# Patient Record
Sex: FEMALE | Race: WHITE | Employment: FULL TIME | URBAN - NONMETROPOLITAN AREA
[De-identification: names, ages, dates, MRNs, and addresses within clinical notes are randomized per-mention and may not be internally consistent; named-entity substitution may affect disease eponyms.]

---

## 2019-08-30 ENCOUNTER — OFFICE VISIT (OUTPATIENT)
Dept: FAMILY MEDICINE CLINIC | Facility: CLINIC | Age: 44
End: 2019-08-30
Payer: COMMERCIAL

## 2019-08-30 ENCOUNTER — LAB ENCOUNTER (OUTPATIENT)
Dept: LAB | Age: 44
End: 2019-08-30
Attending: NURSE PRACTITIONER
Payer: COMMERCIAL

## 2019-08-30 VITALS
OXYGEN SATURATION: 99 % | SYSTOLIC BLOOD PRESSURE: 148 MMHG | HEIGHT: 63 IN | TEMPERATURE: 98 F | HEART RATE: 72 BPM | BODY MASS INDEX: 39.87 KG/M2 | DIASTOLIC BLOOD PRESSURE: 88 MMHG | WEIGHT: 225 LBS

## 2019-08-30 DIAGNOSIS — R53.82 CHRONIC FATIGUE: ICD-10-CM

## 2019-08-30 DIAGNOSIS — M79.7 FIBROMYALGIA: ICD-10-CM

## 2019-08-30 DIAGNOSIS — F41.9 ANXIETY: ICD-10-CM

## 2019-08-30 DIAGNOSIS — Z86.2 HISTORY OF ANEMIA: ICD-10-CM

## 2019-08-30 DIAGNOSIS — R03.0 ELEVATED BLOOD PRESSURE READING: Primary | ICD-10-CM

## 2019-08-30 DIAGNOSIS — R07.89 CHEST TIGHTNESS: ICD-10-CM

## 2019-08-30 DIAGNOSIS — R03.0 ELEVATED BLOOD PRESSURE READING: ICD-10-CM

## 2019-08-30 LAB
ALBUMIN SERPL-MCNC: 4 G/DL (ref 3.4–5)
ALBUMIN/GLOB SERPL: 1.2 {RATIO} (ref 1–2)
ALP LIVER SERPL-CCNC: 49 U/L (ref 37–98)
ALT SERPL-CCNC: 29 U/L (ref 13–56)
ANION GAP SERPL CALC-SCNC: 10 MMOL/L (ref 0–18)
AST SERPL-CCNC: 17 U/L (ref 15–37)
BASOPHILS # BLD AUTO: 0.04 X10(3) UL (ref 0–0.2)
BASOPHILS NFR BLD AUTO: 0.5 %
BILIRUB SERPL-MCNC: 0.6 MG/DL (ref 0.1–2)
BUN BLD-MCNC: 10 MG/DL (ref 7–18)
BUN/CREAT SERPL: 14.7 (ref 10–20)
CALCIUM BLD-MCNC: 8.9 MG/DL (ref 8.5–10.1)
CHLORIDE SERPL-SCNC: 105 MMOL/L (ref 98–112)
CHOLEST SMN-MCNC: 249 MG/DL (ref ?–200)
CO2 SERPL-SCNC: 27 MMOL/L (ref 21–32)
CREAT BLD-MCNC: 0.68 MG/DL (ref 0.55–1.02)
DEPRECATED RDW RBC AUTO: 47.8 FL (ref 35.1–46.3)
EOSINOPHIL # BLD AUTO: 0.15 X10(3) UL (ref 0–0.7)
EOSINOPHIL NFR BLD AUTO: 1.8 %
ERYTHROCYTE [DISTWIDTH] IN BLOOD BY AUTOMATED COUNT: 13.7 % (ref 11–15)
GLOBULIN PLAS-MCNC: 3.3 G/DL (ref 2.8–4.4)
GLUCOSE BLD-MCNC: 156 MG/DL (ref 70–99)
HCT VFR BLD AUTO: 45.9 % (ref 35–48)
HDLC SERPL-MCNC: 39 MG/DL (ref 40–59)
HGB BLD-MCNC: 15 G/DL (ref 12–16)
IMM GRANULOCYTES # BLD AUTO: 0.02 X10(3) UL (ref 0–1)
IMM GRANULOCYTES NFR BLD: 0.2 %
LDLC SERPL CALC-MCNC: 164 MG/DL (ref ?–100)
LYMPHOCYTES # BLD AUTO: 2.19 X10(3) UL (ref 1–4)
LYMPHOCYTES NFR BLD AUTO: 26.9 %
M PROTEIN MFR SERPL ELPH: 7.3 G/DL (ref 6.4–8.2)
MCH RBC QN AUTO: 30.7 PG (ref 26–34)
MCHC RBC AUTO-ENTMCNC: 32.7 G/DL (ref 31–37)
MCV RBC AUTO: 93.9 FL (ref 80–100)
MONOCYTES # BLD AUTO: 0.7 X10(3) UL (ref 0.1–1)
MONOCYTES NFR BLD AUTO: 8.6 %
NEUTROPHILS # BLD AUTO: 5.05 X10 (3) UL (ref 1.5–7.7)
NEUTROPHILS # BLD AUTO: 5.05 X10(3) UL (ref 1.5–7.7)
NEUTROPHILS NFR BLD AUTO: 62 %
NONHDLC SERPL-MCNC: 210 MG/DL (ref ?–130)
OSMOLALITY SERPL CALC.SUM OF ELEC: 296 MOSM/KG (ref 275–295)
PLATELET # BLD AUTO: 335 10(3)UL (ref 150–450)
POTASSIUM SERPL-SCNC: 3.9 MMOL/L (ref 3.5–5.1)
RBC # BLD AUTO: 4.89 X10(6)UL (ref 3.8–5.3)
SODIUM SERPL-SCNC: 142 MMOL/L (ref 136–145)
TRIGL SERPL-MCNC: 231 MG/DL (ref 30–149)
TSI SER-ACNC: 1.2 MIU/ML (ref 0.36–3.74)
VIT B12 SERPL-MCNC: 342 PG/ML (ref 193–986)
VIT D+METAB SERPL-MCNC: 25 NG/ML (ref 30–100)
VLDLC SERPL CALC-MCNC: 46 MG/DL (ref 0–30)
WBC # BLD AUTO: 8.2 X10(3) UL (ref 4–11)

## 2019-08-30 PROCEDURE — 83036 HEMOGLOBIN GLYCOSYLATED A1C: CPT | Performed by: NURSE PRACTITIONER

## 2019-08-30 PROCEDURE — 93000 ELECTROCARDIOGRAM COMPLETE: CPT | Performed by: NURSE PRACTITIONER

## 2019-08-30 PROCEDURE — 99204 OFFICE O/P NEW MOD 45 MIN: CPT | Performed by: NURSE PRACTITIONER

## 2019-08-30 PROCEDURE — 82607 VITAMIN B-12: CPT | Performed by: NURSE PRACTITIONER

## 2019-08-30 PROCEDURE — 80050 GENERAL HEALTH PANEL: CPT | Performed by: NURSE PRACTITIONER

## 2019-08-30 PROCEDURE — 82306 VITAMIN D 25 HYDROXY: CPT | Performed by: NURSE PRACTITIONER

## 2019-08-30 PROCEDURE — 80061 LIPID PANEL: CPT | Performed by: NURSE PRACTITIONER

## 2019-08-30 PROCEDURE — 36415 COLL VENOUS BLD VENIPUNCTURE: CPT | Performed by: NURSE PRACTITIONER

## 2019-08-30 RX ORDER — ALPRAZOLAM 0.5 MG/1
TABLET ORAL
Refills: 2 | COMMUNITY
Start: 2019-08-17 | End: 2019-11-27

## 2019-08-30 RX ORDER — HYDROCHLOROTHIAZIDE 12.5 MG/1
12.5 CAPSULE, GELATIN COATED ORAL DAILY
Qty: 30 CAPSULE | Refills: 0 | Status: SHIPPED | OUTPATIENT
Start: 2019-08-30 | End: 2019-09-22

## 2019-08-30 RX ORDER — ESCITALOPRAM OXALATE 10 MG/1
10 TABLET ORAL DAILY
Qty: 30 TABLET | Refills: 0 | Status: SHIPPED | OUTPATIENT
Start: 2019-08-30 | End: 2019-09-22

## 2019-08-30 NOTE — PROGRESS NOTES
HPI:   Patient is here for elevated BP. She had an appt with her gyne this morning for her annual exam. Her blood pressure was 170/112. She has been told her blood pressure has been elevated in the past but has never been on medication.  She has been unde Diagnoses and all orders for this visit:    Elevated blood pressure reading  -     ELECTROCARDIOGRAM, COMPLETE  -     CBC WITH DIFFERENTIAL WITH PLATELET; Future  -     COMP METABOLIC PANEL (14);  Future  -     TSH W REFLEX TO FREE T4; Future  -     LIPID

## 2019-08-31 DIAGNOSIS — E55.9 VITAMIN D DEFICIENCY: Primary | ICD-10-CM

## 2019-08-31 DIAGNOSIS — R73.09 ELEVATED RANDOM BLOOD GLUCOSE LEVEL: Primary | ICD-10-CM

## 2019-08-31 DIAGNOSIS — E78.00 ELEVATED CHOLESTEROL: ICD-10-CM

## 2019-08-31 DIAGNOSIS — E55.9 VITAMIN D DEFICIENCY: ICD-10-CM

## 2019-08-31 DIAGNOSIS — R03.0 ELEVATED BLOOD PRESSURE READING: Primary | ICD-10-CM

## 2019-08-31 LAB
EST. AVERAGE GLUCOSE BLD GHB EST-MCNC: 143 MG/DL (ref 68–126)
HBA1C MFR BLD HPLC: 6.6 % (ref ?–5.7)

## 2019-08-31 RX ORDER — ERGOCALCIFEROL (VITAMIN D2) 1250 MCG
50000 CAPSULE ORAL WEEKLY
Qty: 4 CAPSULE | Refills: 1 | Status: SHIPPED | OUTPATIENT
Start: 2019-08-31 | End: 2019-09-30

## 2019-09-06 ENCOUNTER — OFFICE VISIT (OUTPATIENT)
Dept: FAMILY MEDICINE CLINIC | Facility: CLINIC | Age: 44
End: 2019-09-06
Payer: COMMERCIAL

## 2019-09-06 VITALS
DIASTOLIC BLOOD PRESSURE: 80 MMHG | SYSTOLIC BLOOD PRESSURE: 138 MMHG | HEART RATE: 78 BPM | HEIGHT: 63 IN | TEMPERATURE: 98 F | OXYGEN SATURATION: 99 % | BODY MASS INDEX: 38.27 KG/M2 | RESPIRATION RATE: 18 BRPM | WEIGHT: 216 LBS

## 2019-09-06 DIAGNOSIS — I10 BENIGN HYPERTENSION: ICD-10-CM

## 2019-09-06 DIAGNOSIS — F41.9 ANXIETY: ICD-10-CM

## 2019-09-06 DIAGNOSIS — E11.9 NEW ONSET TYPE 2 DIABETES MELLITUS (HCC): Primary | ICD-10-CM

## 2019-09-06 DIAGNOSIS — K13.70 ORAL LESION: ICD-10-CM

## 2019-09-06 DIAGNOSIS — E55.9 VITAMIN D DEFICIENCY: ICD-10-CM

## 2019-09-06 PROCEDURE — 99214 OFFICE O/P EST MOD 30 MIN: CPT | Performed by: NURSE PRACTITIONER

## 2019-09-06 NOTE — PROGRESS NOTES
HPI:   Patient is here for 1 week follow up for her blood pressure, anxiety and to discuss lab results. At her last visit she was started on Hctz and Lexapro. We also started her on Vitamin D after her lab results revealed a vitamin d deficiency.  She rep /80   Pulse 78   Temp 97.9 °F (36.6 °C) (Temporal)   Resp 18   Ht 63\"   Wt 216 lb   SpO2 99%   BMI 38.26 kg/m²   Physical Exam    Constitutional: She is obese. She appears well-developed and well-nourished. No distress.    HENT:   Mouth/Throat:

## 2019-09-22 ENCOUNTER — TELEPHONE (OUTPATIENT)
Dept: FAMILY MEDICINE CLINIC | Facility: CLINIC | Age: 44
End: 2019-09-22

## 2019-09-22 DIAGNOSIS — R03.0 ELEVATED BLOOD PRESSURE READING: ICD-10-CM

## 2019-09-22 DIAGNOSIS — F41.9 ANXIETY: ICD-10-CM

## 2019-09-24 RX ORDER — HYDROCHLOROTHIAZIDE 12.5 MG/1
CAPSULE, GELATIN COATED ORAL
Qty: 90 CAPSULE | Refills: 0 | Status: SHIPPED | OUTPATIENT
Start: 2019-09-24 | End: 2019-12-06

## 2019-09-24 RX ORDER — ESCITALOPRAM OXALATE 10 MG/1
TABLET ORAL
Qty: 30 TABLET | Refills: 0 | Status: SHIPPED | OUTPATIENT
Start: 2019-09-24 | End: 2019-10-22

## 2019-10-21 DIAGNOSIS — F41.9 ANXIETY: ICD-10-CM

## 2019-10-22 RX ORDER — ESCITALOPRAM OXALATE 10 MG/1
TABLET ORAL
Qty: 30 TABLET | Refills: 0 | Status: SHIPPED | OUTPATIENT
Start: 2019-10-22 | End: 2019-11-23

## 2019-11-23 DIAGNOSIS — F41.9 ANXIETY: ICD-10-CM

## 2019-11-23 NOTE — TELEPHONE ENCOUNTER
Last refill #30 on 10/22/19  Last office visit on 9/6/19  Future Appointments   Date Time Provider Maria Guadalupe Martins   12/6/2019  9:00 AM [facility]

## 2019-11-24 RX ORDER — ESCITALOPRAM OXALATE 10 MG/1
TABLET ORAL
Qty: 30 TABLET | Refills: 0 | Status: SHIPPED | OUTPATIENT
Start: 2019-11-24 | End: 2019-12-06

## 2019-11-27 ENCOUNTER — TELEPHONE (OUTPATIENT)
Dept: FAMILY MEDICINE CLINIC | Facility: CLINIC | Age: 44
End: 2019-11-27

## 2019-11-27 DIAGNOSIS — F41.9 ANXIETY: ICD-10-CM

## 2019-11-27 RX ORDER — ALPRAZOLAM 0.5 MG/1
TABLET ORAL
Qty: 14 TABLET | Refills: 0 | Status: SHIPPED | OUTPATIENT
Start: 2019-11-27 | End: 2019-12-06

## 2019-11-27 NOTE — TELEPHONE ENCOUNTER
REFILL ALPRAZOLAM 0.5MG    HAS 1 LEFT  FOR TONIGHT  (PREVIOUSLY PRESCRIBED BY DR Janine CASTELLANO)     ALEJANDRO MOST LIKELY SENT REFILL REQUEST TO PREVIOUS PROVIDER.          PLEASE SEND TO [address]

## 2019-12-06 ENCOUNTER — OFFICE VISIT (OUTPATIENT)
Dept: FAMILY MEDICINE CLINIC | Facility: CLINIC | Age: 44
End: 2019-12-06
Payer: COMMERCIAL

## 2019-12-06 ENCOUNTER — APPOINTMENT (OUTPATIENT)
Dept: LAB | Age: 44
End: 2019-12-06
Attending: FAMILY MEDICINE
Payer: COMMERCIAL

## 2019-12-06 VITALS
TEMPERATURE: 98 F | OXYGEN SATURATION: 99 % | WEIGHT: 197 LBS | BODY MASS INDEX: 34.91 KG/M2 | HEIGHT: 63 IN | SYSTOLIC BLOOD PRESSURE: 128 MMHG | DIASTOLIC BLOOD PRESSURE: 78 MMHG | RESPIRATION RATE: 18 BRPM | HEART RATE: 78 BPM

## 2019-12-06 DIAGNOSIS — R23.2 HOT FLASHES: ICD-10-CM

## 2019-12-06 DIAGNOSIS — F51.05 INSOMNIA DUE TO OTHER MENTAL DISORDER: ICD-10-CM

## 2019-12-06 DIAGNOSIS — F41.9 ANXIETY: ICD-10-CM

## 2019-12-06 DIAGNOSIS — E11.9 NEW ONSET TYPE 2 DIABETES MELLITUS (HCC): ICD-10-CM

## 2019-12-06 DIAGNOSIS — F99 INSOMNIA DUE TO OTHER MENTAL DISORDER: ICD-10-CM

## 2019-12-06 DIAGNOSIS — R23.2 HOT FLASHES: Primary | ICD-10-CM

## 2019-12-06 DIAGNOSIS — I10 BENIGN HYPERTENSION: ICD-10-CM

## 2019-12-06 DIAGNOSIS — E55.9 VITAMIN D DEFICIENCY: ICD-10-CM

## 2019-12-06 DIAGNOSIS — R03.0 ELEVATED BLOOD PRESSURE READING: ICD-10-CM

## 2019-12-06 PROCEDURE — 80053 COMPREHEN METABOLIC PANEL: CPT | Performed by: NURSE PRACTITIONER

## 2019-12-06 PROCEDURE — 82570 ASSAY OF URINE CREATININE: CPT | Performed by: NURSE PRACTITIONER

## 2019-12-06 PROCEDURE — 99214 OFFICE O/P EST MOD 30 MIN: CPT | Performed by: NURSE PRACTITIONER

## 2019-12-06 PROCEDURE — 82670 ASSAY OF TOTAL ESTRADIOL: CPT | Performed by: NURSE PRACTITIONER

## 2019-12-06 PROCEDURE — 83002 ASSAY OF GONADOTROPIN (LH): CPT | Performed by: NURSE PRACTITIONER

## 2019-12-06 PROCEDURE — 36415 COLL VENOUS BLD VENIPUNCTURE: CPT | Performed by: NURSE PRACTITIONER

## 2019-12-06 PROCEDURE — 82043 UR ALBUMIN QUANTITATIVE: CPT | Performed by: NURSE PRACTITIONER

## 2019-12-06 PROCEDURE — 83036 HEMOGLOBIN GLYCOSYLATED A1C: CPT | Performed by: NURSE PRACTITIONER

## 2019-12-06 PROCEDURE — 83001 ASSAY OF GONADOTROPIN (FSH): CPT | Performed by: NURSE PRACTITIONER

## 2019-12-06 RX ORDER — ALPRAZOLAM 0.5 MG/1
0.5 TABLET ORAL NIGHTLY PRN
Qty: 30 TABLET | Refills: 0 | Status: SHIPPED | OUTPATIENT
Start: 2019-12-06 | End: 2020-01-17

## 2019-12-06 RX ORDER — HYDROCHLOROTHIAZIDE 12.5 MG/1
CAPSULE, GELATIN COATED ORAL
Qty: 90 CAPSULE | Refills: 0 | Status: SHIPPED | OUTPATIENT
Start: 2019-12-06 | End: 2020-03-19

## 2019-12-06 RX ORDER — ESCITALOPRAM OXALATE 10 MG/1
TABLET ORAL
Qty: 90 TABLET | Refills: 0 | Status: SHIPPED | OUTPATIENT
Start: 2019-12-06 | End: 2020-03-19

## 2019-12-06 NOTE — PROGRESS NOTES
HPI:   Patient is here for 3 month follow up for new onset diabetes, anxiety and high blood pressure. She has been doing the [employer] and has lost 20 lbs. She feels much better. Still having some difficulty with sleeping.  Unable to sleep without a Xanax Gastrointestinal: Negative for nausea, vomiting, abdominal pain, diarrhea and constipation. Endocrine: Negative for cold intolerance, heat intolerance, polydipsia, polyphagia and polyuria. Genitourinary: Positive for hot flashes.    Psychiatric/Behavi Take 1 tablet (0.5 mg total) by mouth nightly as needed for Sleep or Anxiety. Anxiety  -     ALPRAZolam 0.5 MG Oral Tab; Take 1 tablet (0.5 mg total) by mouth nightly as needed for Sleep or Anxiety.   -     escitalopram 10 MG Oral Tab; TAKE 1 TABLET(10 M

## 2019-12-10 ENCOUNTER — PATIENT MESSAGE (OUTPATIENT)
Dept: FAMILY MEDICINE CLINIC | Facility: CLINIC | Age: 44
End: 2019-12-10

## 2019-12-10 NOTE — TELEPHONE ENCOUNTER
From: America Sacks  To: REDD Hall  Sent: 12/10/2019 8:13 AM CST  Subject: Visit Follow-up Question     I'm not sure how to interpret the hormone levels from the blood test and if they're normal or low.  Did I need anything to balance them o

## 2019-12-11 NOTE — TELEPHONE ENCOUNTER
The hormone levels indicate that she is menopausal, which could explain the hot flashes. I would not recommend hormone replacement therapy due to the hypertension and diabetes. She could see a gynecologist for their opinion though.

## 2019-12-26 DIAGNOSIS — F51.05 INSOMNIA DUE TO OTHER MENTAL DISORDER: ICD-10-CM

## 2019-12-26 DIAGNOSIS — F41.9 ANXIETY: ICD-10-CM

## 2019-12-26 DIAGNOSIS — F99 INSOMNIA DUE TO OTHER MENTAL DISORDER: ICD-10-CM

## 2019-12-26 RX ORDER — ALPRAZOLAM 0.5 MG/1
TABLET ORAL
Qty: 30 TABLET | Refills: 0 | OUTPATIENT
Start: 2019-12-26

## 2019-12-26 NOTE — TELEPHONE ENCOUNTER
LOV: 12/6/19    LAST LAB: 12/6/19    LAST RX: 12/6/19, 30 tablets, 0 refills    Next OV: No future appointments.     PROTOCOL : n/a

## 2020-01-17 DIAGNOSIS — F51.05 INSOMNIA DUE TO OTHER MENTAL DISORDER: ICD-10-CM

## 2020-01-17 DIAGNOSIS — F99 INSOMNIA DUE TO OTHER MENTAL DISORDER: ICD-10-CM

## 2020-01-17 DIAGNOSIS — F41.9 ANXIETY: ICD-10-CM

## 2020-01-17 RX ORDER — ALPRAZOLAM 0.5 MG/1
TABLET ORAL
Qty: 14 TABLET | Refills: 0 | Status: SHIPPED | OUTPATIENT
Start: 2020-01-17 | End: 2020-02-05

## 2020-01-17 RX ORDER — ALPRAZOLAM 0.5 MG/1
0.5 TABLET ORAL NIGHTLY PRN
Qty: 30 TABLET | Refills: 0 | OUTPATIENT
Start: 2020-01-17

## 2020-02-05 DIAGNOSIS — F41.9 ANXIETY: ICD-10-CM

## 2020-02-05 DIAGNOSIS — F99 INSOMNIA DUE TO OTHER MENTAL DISORDER: ICD-10-CM

## 2020-02-05 DIAGNOSIS — F51.05 INSOMNIA DUE TO OTHER MENTAL DISORDER: ICD-10-CM

## 2020-02-05 RX ORDER — ALPRAZOLAM 0.5 MG/1
TABLET ORAL
Qty: 14 TABLET | Refills: 0 | Status: SHIPPED | OUTPATIENT
Start: 2020-02-05 | End: 2020-02-27

## 2020-02-05 NOTE — TELEPHONE ENCOUNTER
Last office visit: 12/6/19  Last refill: 1/17/2020  Labs Due: 2/29/2020  No future appointments.   Protocol: none   Name from pharmacy: ALPRAZOLAM 0.5MG TABLETS         Will file in chart as: ALPRAZOLAM 0.5 MG Oral Tab         Sig: TAKE 1 TABLET BY MOUTH EV

## 2020-02-27 DIAGNOSIS — F51.05 INSOMNIA DUE TO OTHER MENTAL DISORDER: ICD-10-CM

## 2020-02-27 DIAGNOSIS — F41.9 ANXIETY: ICD-10-CM

## 2020-02-27 DIAGNOSIS — F99 INSOMNIA DUE TO OTHER MENTAL DISORDER: ICD-10-CM

## 2020-02-27 RX ORDER — ALPRAZOLAM 0.5 MG/1
TABLET ORAL
Qty: 14 TABLET | Refills: 0 | Status: SHIPPED | OUTPATIENT
Start: 2020-02-27 | End: 2020-03-19

## 2020-02-27 NOTE — TELEPHONE ENCOUNTER
Last refill #14 on 2/5/2020  Last office visit on 12/6/19  Patient has only been seen by Joni Jeffery. No future appointments.

## 2020-02-28 ENCOUNTER — OFFICE VISIT (OUTPATIENT)
Dept: FAMILY MEDICINE CLINIC | Facility: CLINIC | Age: 45
End: 2020-02-28

## 2020-02-28 ENCOUNTER — TELEPHONE (OUTPATIENT)
Dept: FAMILY MEDICINE CLINIC | Facility: CLINIC | Age: 45
End: 2020-02-28

## 2020-02-28 VITALS
SYSTOLIC BLOOD PRESSURE: 126 MMHG | DIASTOLIC BLOOD PRESSURE: 74 MMHG | RESPIRATION RATE: 16 BRPM | OXYGEN SATURATION: 98 % | HEIGHT: 63 IN | HEART RATE: 76 BPM | WEIGHT: 199.13 LBS | TEMPERATURE: 97 F | BODY MASS INDEX: 35.28 KG/M2

## 2020-02-28 DIAGNOSIS — B75: Primary | ICD-10-CM

## 2020-02-28 DIAGNOSIS — J01.00 ACUTE NON-RECURRENT MAXILLARY SINUSITIS: ICD-10-CM

## 2020-02-28 DIAGNOSIS — K59.09 OTHER CONSTIPATION: ICD-10-CM

## 2020-02-28 PROCEDURE — 99214 OFFICE O/P EST MOD 30 MIN: CPT | Performed by: NURSE PRACTITIONER

## 2020-02-28 RX ORDER — ALBENDAZOLE 200 MG/1
400 TABLET, FILM COATED ORAL 2 TIMES DAILY
Qty: 40 TABLET | Refills: 0 | Status: SHIPPED | OUTPATIENT
Start: 2020-02-28 | End: 2020-03-09

## 2020-02-28 RX ORDER — AZITHROMYCIN 250 MG/1
TABLET, FILM COATED ORAL
Qty: 6 TABLET | Refills: 0 | Status: SHIPPED | OUTPATIENT
Start: 2020-02-28 | End: 2020-03-26 | Stop reason: ALTCHOICE

## 2020-02-28 NOTE — PROGRESS NOTES
HPI:   She believes she has stomach parasite. She was in [place] 3 weeks ago and went to [facility]. She started noticing some constipation while on vacation but it has worsened since she returned. She has seen some worms in her stool as well.      Juana Chambers swallowing. Respiratory: Negative for cough, shortness of breath and wheezing. Cardiovascular: Negative for chest pain and palpitations. Gastrointestinal: Positive for abdominal pain (intermittently cramps), constipation and abdominal distention.  Melinda Vale Start anthelmintic and obtain stool for O&P. Orders:  -     albendazole 200 MG Oral Tab; Take 2 tablets (400 mg total) by mouth 2 (two) times daily for 10 days.  -     OVA AND PARASITES;  Future    Acute non-recurrent maxillary sinusitis  -     mirlande

## 2020-03-02 ENCOUNTER — LAB ENCOUNTER (OUTPATIENT)
Dept: LAB | Age: 45
End: 2020-03-02
Attending: FAMILY MEDICINE
Payer: COMMERCIAL

## 2020-03-02 DIAGNOSIS — B75: ICD-10-CM

## 2020-03-02 PROCEDURE — 87329 GIARDIA AG IA: CPT

## 2020-03-02 PROCEDURE — 87272 CRYPTOSPORIDIUM AG IF: CPT

## 2020-03-02 PROCEDURE — 87177 OVA AND PARASITES SMEARS: CPT

## 2020-03-02 PROCEDURE — 87209 SMEAR COMPLEX STAIN: CPT

## 2020-03-03 LAB
CRYPTOSP AG STL QL IA: NEGATIVE
G LAMBLIA AG STL QL IA: NEGATIVE

## 2020-03-05 LAB — OVA AND PARASITE, FECAL INTERPRETATION: NEGATIVE

## 2020-03-16 DIAGNOSIS — F41.9 ANXIETY: ICD-10-CM

## 2020-03-16 DIAGNOSIS — F99 INSOMNIA DUE TO OTHER MENTAL DISORDER: ICD-10-CM

## 2020-03-16 DIAGNOSIS — F51.05 INSOMNIA DUE TO OTHER MENTAL DISORDER: ICD-10-CM

## 2020-03-17 RX ORDER — ALPRAZOLAM 0.5 MG/1
TABLET ORAL
Qty: 14 TABLET | Refills: 0 | OUTPATIENT
Start: 2020-03-17

## 2020-03-17 NOTE — TELEPHONE ENCOUNTER
LOV  2/28/2020     LAST LAB  12/06/2019    LAST RX  2/27/2020  (14 tabs, 0 refill)    Next OV  No future appointments.     PROTOCOL  none

## 2020-03-19 DIAGNOSIS — F41.9 ANXIETY: ICD-10-CM

## 2020-03-19 DIAGNOSIS — F51.05 INSOMNIA DUE TO OTHER MENTAL DISORDER: ICD-10-CM

## 2020-03-19 DIAGNOSIS — R03.0 ELEVATED BLOOD PRESSURE READING: ICD-10-CM

## 2020-03-19 DIAGNOSIS — F99 INSOMNIA DUE TO OTHER MENTAL DISORDER: ICD-10-CM

## 2020-03-19 RX ORDER — ALPRAZOLAM 0.5 MG/1
0.5 TABLET ORAL
Qty: 14 TABLET | Refills: 0 | Status: SHIPPED | OUTPATIENT
Start: 2020-03-19 | End: 2020-04-16

## 2020-03-19 RX ORDER — HYDROCHLOROTHIAZIDE 12.5 MG/1
CAPSULE, GELATIN COATED ORAL
Qty: 90 CAPSULE | Refills: 0 | Status: SHIPPED | OUTPATIENT
Start: 2020-03-19 | End: 2020-06-29

## 2020-03-19 RX ORDER — ESCITALOPRAM OXALATE 10 MG/1
TABLET ORAL
Qty: 90 TABLET | Refills: 0 | Status: SHIPPED | OUTPATIENT
Start: 2020-03-19 | End: 2020-06-29

## 2020-03-19 NOTE — TELEPHONE ENCOUNTER
Hypertension Medications Protocol Passed3/19 8:16 AM   CMP or BMP in past 12 months    Last serum creatinine< 2.0    Appointment in past 6 or next 3 months     Last OV w/ Nii Collier Np 2/28/20-acute  Overdue for annual physical  Last [facility] 12/6/19  Las

## 2020-03-26 ENCOUNTER — TELEPHONE (OUTPATIENT)
Dept: FAMILY MEDICINE CLINIC | Facility: CLINIC | Age: 45
End: 2020-03-26

## 2020-03-26 ENCOUNTER — OFFICE VISIT (OUTPATIENT)
Dept: FAMILY MEDICINE CLINIC | Facility: CLINIC | Age: 45
End: 2020-03-26

## 2020-03-26 VITALS
SYSTOLIC BLOOD PRESSURE: 114 MMHG | HEART RATE: 60 BPM | WEIGHT: 200 LBS | TEMPERATURE: 99 F | DIASTOLIC BLOOD PRESSURE: 74 MMHG | BODY MASS INDEX: 35 KG/M2

## 2020-03-26 DIAGNOSIS — M26.629 TMJ PAIN DYSFUNCTION SYNDROME: Primary | ICD-10-CM

## 2020-03-26 DIAGNOSIS — H92.01 OTALGIA OF RIGHT EAR: ICD-10-CM

## 2020-03-26 DIAGNOSIS — F41.9 ANXIETY: ICD-10-CM

## 2020-03-26 PROCEDURE — 99213 OFFICE O/P EST LOW 20 MIN: CPT | Performed by: FAMILY MEDICINE

## 2020-03-26 PROCEDURE — 98925 OSTEOPATH MANJ 1-2 REGIONS: CPT | Performed by: FAMILY MEDICINE

## 2020-03-26 RX ORDER — CYCLOBENZAPRINE HCL 10 MG
10 TABLET ORAL 3 TIMES DAILY
Qty: 30 TABLET | Refills: 1 | Status: SHIPPED | OUTPATIENT
Start: 2020-03-26 | End: 2020-04-15

## 2020-03-26 RX ORDER — CETIRIZINE HYDROCHLORIDE 10 MG/1
10 TABLET ORAL DAILY
COMMUNITY
End: 2020-09-17 | Stop reason: ALTCHOICE

## 2020-03-26 RX ORDER — MELOXICAM 15 MG/1
15 TABLET ORAL DAILY
Qty: 30 TABLET | Refills: 0 | Status: SHIPPED | OUTPATIENT
Start: 2020-03-26 | End: 2020-09-17 | Stop reason: ALTCHOICE

## 2020-03-26 RX ORDER — FLUTICASONE PROPIONATE 50 MCG
SPRAY, SUSPENSION (ML) NASAL DAILY
COMMUNITY
End: 2020-09-17 | Stop reason: ALTCHOICE

## 2020-03-26 NOTE — PROGRESS NOTES
HPI:    Patient ID: Jeffry Pagan is a 38year old female. Hx sinus infx / ear  R ear pain  + stress  HPI    Review of Systems   Constitutional: Negative for chills and fever. HENT: Positive for congestion (mild) and rhinorrhea.     Respiratory: Negat gum  Counseling         ASSESSMENT/PLAN:   Tmj pain dysfunction syndrome  (primary encounter diagnosis)  Otalgia of right ear  Anxiety    No orders of the defined types were placed in this encounter.       Meds This Visit:  Requested Prescriptions     Juany

## 2020-03-26 NOTE — TELEPHONE ENCOUNTER
My Chart message, was seen by Morgan Zarate on 2/28/20    Telemed call?         From: Lux Sheikh  To: REDD Bui  Sent: 3/25/2020  7:30 PM CDT  Subject: Visit Follow-up Question    Janak Francis        [facility] right ear is still hurting me.  I've tried b

## 2020-04-14 ENCOUNTER — TELEPHONE (OUTPATIENT)
Dept: FAMILY MEDICINE CLINIC | Facility: CLINIC | Age: 45
End: 2020-04-14

## 2020-04-14 DIAGNOSIS — G56.03 CARPAL TUNNEL SYNDROME, BILATERAL: Primary | ICD-10-CM

## 2020-04-14 NOTE — TELEPHONE ENCOUNTER
Spoke with patient who went to [facility] ER this morning and was diagnosed with Carpel Tunnel in both arms. Is having extreme pain. ER stated patient see an ortho in the next couple of days. Please advise on who you recommend. Patient has [facility] insurance.

## 2020-04-14 NOTE — TELEPHONE ENCOUNTER
Referral placed. Detailed message left with patient per patient request with Dr. Mahi Paiz contact information. Patient instructed to call back if she has any questions or concerns.

## 2020-04-16 ENCOUNTER — TELEPHONE (OUTPATIENT)
Dept: FAMILY MEDICINE CLINIC | Facility: CLINIC | Age: 45
End: 2020-04-16

## 2020-04-16 DIAGNOSIS — F51.05 INSOMNIA DUE TO OTHER MENTAL DISORDER: ICD-10-CM

## 2020-04-16 DIAGNOSIS — F41.9 ANXIETY: ICD-10-CM

## 2020-04-16 DIAGNOSIS — F99 INSOMNIA DUE TO OTHER MENTAL DISORDER: ICD-10-CM

## 2020-04-16 RX ORDER — ALPRAZOLAM 0.5 MG/1
TABLET ORAL
Qty: 14 TABLET | Refills: 0 | Status: SHIPPED | OUTPATIENT
Start: 2020-04-16 | End: 2020-05-01

## 2020-04-16 NOTE — TELEPHONE ENCOUNTER
LOV  3/26/2020    LAST LAB  12/6/2019    LAST RX  ALPRAZolam 0.5 MG Oral Tab 14 tablet 0 refill 3/19/2020     Next OV  Future Appointments   Date Time Provider Maria Guadalupe Martins   4/20/2020  9:20 AM Latrice Arguelles MD [facility]     PROTOCOL  none

## 2020-04-16 NOTE — TELEPHONE ENCOUNTER
She can not get into see Dr. Solis Logpretty  till Monday. The work note only goes through today from the ER. Wondering if Dr. Howard Gonsalez will write note for Friday 4/17 and Saturday 4/18 ? ?  She states the pain is to bad in her hands to go back before she sees

## 2020-04-16 NOTE — TELEPHONE ENCOUNTER
Pt notified of Dr Balta العلي thoughts, also told her that if pt does not find it necessary to see Dr Patricia Escobar and just wants a note, she can get the work note from the dr at [facility] that she will be seeing.

## 2020-04-16 NOTE — TELEPHONE ENCOUNTER
Dr Kristina Sanders, do you want to do a Telemed visit with Pt , we have not seen her since her ER visit on 4/14

## 2020-04-16 NOTE — TELEPHONE ENCOUNTER
She does not want a visit said its unnecessary to make her do a visit and then see ortho Monday. She just wants him to write a note for work since she can not get one from ortho until Monday.  She said if she had to do a phone visit then fine, but doesn't s

## 2020-04-20 PROBLEM — G56.03 CARPAL TUNNEL SYNDROME, BILATERAL: Status: ACTIVE | Noted: 2020-04-20

## 2020-05-01 DIAGNOSIS — F99 INSOMNIA DUE TO OTHER MENTAL DISORDER: ICD-10-CM

## 2020-05-01 DIAGNOSIS — F41.9 ANXIETY: ICD-10-CM

## 2020-05-01 DIAGNOSIS — F51.05 INSOMNIA DUE TO OTHER MENTAL DISORDER: ICD-10-CM

## 2020-05-01 RX ORDER — ALPRAZOLAM 0.5 MG/1
TABLET ORAL
Qty: 14 TABLET | Refills: 0 | Status: SHIPPED | OUTPATIENT
Start: 2020-05-01 | End: 2020-05-18

## 2020-05-01 NOTE — TELEPHONE ENCOUNTER
Last refill: 04/16/20  Qty: 14  W/ 0 refills  Last ov: 03/26/20    Requested Prescriptions     Pending Prescriptions Disp Refills   • ALPRAZOLAM 0.5 MG Oral Tab [Pharmacy Med Name: ALPRAZOLAM 0.5MG TABLETS] 14 tablet 0     Sig: TAKE 1 TABLET(0.5 MG) BY ANTWON

## 2020-05-08 ENCOUNTER — MED REC SCAN ONLY (OUTPATIENT)
Dept: FAMILY MEDICINE CLINIC | Facility: CLINIC | Age: 45
End: 2020-05-08

## 2020-05-18 DIAGNOSIS — F99 INSOMNIA DUE TO OTHER MENTAL DISORDER: ICD-10-CM

## 2020-05-18 DIAGNOSIS — F41.9 ANXIETY: ICD-10-CM

## 2020-05-18 DIAGNOSIS — F51.05 INSOMNIA DUE TO OTHER MENTAL DISORDER: ICD-10-CM

## 2020-05-18 RX ORDER — ALPRAZOLAM 0.5 MG/1
0.5 TABLET ORAL DAILY
Qty: 14 TABLET | Refills: 0 | Status: SHIPPED | OUTPATIENT
Start: 2020-05-18 | End: 2020-05-27

## 2020-05-18 NOTE — TELEPHONE ENCOUNTER
Last refill: 05/01/20  Qty: 14  W/ 0 refills  Last ov: 03/26/20    Requested Prescriptions     Pending Prescriptions Disp Refills   • ALPRAZolam 0.5 MG Oral Tab 14 tablet 0     Sig: Take 1 tablet (0.5 mg total) by mouth daily. No future appointments.

## 2020-05-27 DIAGNOSIS — F99 INSOMNIA DUE TO OTHER MENTAL DISORDER: ICD-10-CM

## 2020-05-27 DIAGNOSIS — F51.05 INSOMNIA DUE TO OTHER MENTAL DISORDER: ICD-10-CM

## 2020-05-27 DIAGNOSIS — F41.9 ANXIETY: ICD-10-CM

## 2020-05-27 RX ORDER — ALPRAZOLAM 0.5 MG/1
0.5 TABLET ORAL DAILY
Qty: 14 TABLET | Refills: 0 | Status: SHIPPED | OUTPATIENT
Start: 2020-05-27 | End: 2020-09-17

## 2020-05-27 NOTE — TELEPHONE ENCOUNTER
Last refill: 05/18/20  Qty: 14  W/ 0 refills  Last ov: 03/26/20    Requested Prescriptions     Pending Prescriptions Disp Refills   • ALPRAZolam 0.5 MG Oral Tab 14 tablet 0     Sig: Take 1 tablet (0.5 mg total) by mouth daily. No future appointments.

## 2020-05-28 NOTE — TELEPHONE ENCOUNTER
Left Message - needs f/u appt with Dr Sigrid Gregg or Nenita Dunn NP to discuss medications      By Nilton Edwards RN

## 2020-06-03 DIAGNOSIS — F51.05 INSOMNIA DUE TO OTHER MENTAL DISORDER: ICD-10-CM

## 2020-06-03 DIAGNOSIS — F41.9 ANXIETY: ICD-10-CM

## 2020-06-03 DIAGNOSIS — F99 INSOMNIA DUE TO OTHER MENTAL DISORDER: ICD-10-CM

## 2020-06-03 NOTE — TELEPHONE ENCOUNTER
LOV:  3/26/2020     LAB:    12/6/2019     LRX:    ALPRAZolam 0.5 MG Oral Tab 14 tablet 0 refill 5/27/2020       NOV:     Future Appointments   Date Time Provider Maria Guadalupe Lakshmi   6/24/2020  9:20 AM Britney Sow MD [facility] NP [facility]

## 2020-06-04 RX ORDER — ALPRAZOLAM 0.5 MG/1
0.5 TABLET ORAL DAILY
Qty: 14 TABLET | Refills: 0 | OUTPATIENT
Start: 2020-06-04

## 2020-06-04 NOTE — TELEPHONE ENCOUNTER
Recommend patient follow-up with Holley or myself using Xanax too frequently. Was meant to be PRN basis. Question need to increase Lexapro.

## 2020-06-05 ENCOUNTER — PATIENT MESSAGE (OUTPATIENT)
Dept: FAMILY MEDICINE CLINIC | Facility: CLINIC | Age: 45
End: 2020-06-05

## 2020-06-05 RX ORDER — ZOLPIDEM TARTRATE 5 MG/1
5 TABLET ORAL NIGHTLY PRN
Qty: 30 TABLET | Refills: 0 | OUTPATIENT
Start: 2020-06-05 | End: 2020-06-29

## 2020-06-05 NOTE — TELEPHONE ENCOUNTER
From: Deborah Simmonds  To: Anat Olivo DO  Sent: 6/5/2020 12:59 AM CDT  Subject: Prescription Question    I had a bilateral carpal tunnel last Tuesday, I put request in early to save trips . Just ran out yesterday. Only use to fall asleep. Thanks.

## 2020-06-05 NOTE — TELEPHONE ENCOUNTER
DC xanax  If using for sleep. Ambien 5 mg q hs prn sleep - # 30  Recommend follow-up with either Holley or myself next week.

## 2020-06-21 ENCOUNTER — PATIENT MESSAGE (OUTPATIENT)
Dept: FAMILY MEDICINE CLINIC | Facility: CLINIC | Age: 45
End: 2020-06-21

## 2020-06-22 NOTE — TELEPHONE ENCOUNTER
From: Zora Simmons  To: REDD Noble  Sent: 6/21/2020 9:54 PM CDT  Subject: Other    Hi Rocael Horde,      I got bitten on Friday night by mosquito's . My reaction is pretty serious. Doing everything recommend to do for it.    Do I need to Epi

## 2020-06-29 DIAGNOSIS — R03.0 ELEVATED BLOOD PRESSURE READING: ICD-10-CM

## 2020-06-29 DIAGNOSIS — F41.9 ANXIETY: ICD-10-CM

## 2020-06-29 RX ORDER — HYDROCHLOROTHIAZIDE 12.5 MG/1
CAPSULE, GELATIN COATED ORAL
Qty: 90 CAPSULE | Refills: 0 | Status: SHIPPED | OUTPATIENT
Start: 2020-06-29 | End: 2020-10-04

## 2020-06-29 RX ORDER — ESCITALOPRAM OXALATE 10 MG/1
TABLET ORAL
Qty: 90 TABLET | Refills: 0 | Status: SHIPPED | OUTPATIENT
Start: 2020-06-29 | End: 2020-10-04

## 2020-06-29 RX ORDER — ZOLPIDEM TARTRATE 5 MG/1
5 TABLET ORAL NIGHTLY PRN
Qty: 30 TABLET | Refills: 0 | Status: SHIPPED | OUTPATIENT
Start: 2020-06-29 | End: 2020-08-06

## 2020-06-29 NOTE — TELEPHONE ENCOUNTER
HCTZ: 3/19/20 #90 w/ 0 refills  Hypertension Medications Protocol Passed  Escitalopram: 3/19/20 #90 w/ 0 refills    Last OV: 3/26/20  Last labs: 12/6/19    Future Appointments   Date Time Provider Maria Guadalupe Martins   7/22/2020 10:20 AM Anastacio Conn MD [facility]

## 2020-06-29 NOTE — TELEPHONE ENCOUNTER
Last refill: 6/5/20 #30 w/ 0 refills  Last oV: 3/26/20    Future Appointments   Date Time Provider Maria Guadalupe Martins   7/22/2020 10:20 AM Lenka Mac MD [facility] NP [facility]

## 2020-08-06 RX ORDER — ZOLPIDEM TARTRATE 5 MG/1
TABLET ORAL
Qty: 30 TABLET | Refills: 0 | Status: SHIPPED | OUTPATIENT
Start: 2020-08-06 | End: 2020-09-08

## 2020-08-06 NOTE — TELEPHONE ENCOUNTER
Last refill: 06/29/20  Qty: 30  W/ 0 refills  Last ov: 03/26/20    Requested Prescriptions     Pending Prescriptions Disp Refills   • ZOLPIDEM 5 MG Oral Tab [Pharmacy Med Name: ZOLPIDEM 5MG TABLETS] 30 tablet 0     Sig: TAKE 1 TABLET(5 MG) BY MOUTH EVERY N

## 2020-09-08 RX ORDER — ZOLPIDEM TARTRATE 5 MG/1
TABLET ORAL
Qty: 30 TABLET | Refills: 0 | Status: SHIPPED | OUTPATIENT
Start: 2020-09-08 | End: 2020-10-04

## 2020-09-17 ENCOUNTER — LAB ENCOUNTER (OUTPATIENT)
Dept: LAB | Age: 45
End: 2020-09-17
Attending: FAMILY MEDICINE
Payer: COMMERCIAL

## 2020-09-17 ENCOUNTER — OFFICE VISIT (OUTPATIENT)
Dept: FAMILY MEDICINE CLINIC | Facility: CLINIC | Age: 45
End: 2020-09-17
Payer: COMMERCIAL

## 2020-09-17 VITALS
OXYGEN SATURATION: 99 % | SYSTOLIC BLOOD PRESSURE: 118 MMHG | DIASTOLIC BLOOD PRESSURE: 78 MMHG | HEIGHT: 63 IN | WEIGHT: 214.13 LBS | TEMPERATURE: 98 F | BODY MASS INDEX: 37.94 KG/M2 | HEART RATE: 68 BPM | RESPIRATION RATE: 18 BRPM

## 2020-09-17 DIAGNOSIS — R63.5 WEIGHT GAIN: Primary | ICD-10-CM

## 2020-09-17 DIAGNOSIS — Z12.31 ENCOUNTER FOR SCREENING MAMMOGRAM FOR MALIGNANT NEOPLASM OF BREAST: ICD-10-CM

## 2020-09-17 DIAGNOSIS — R63.5 WEIGHT GAIN: ICD-10-CM

## 2020-09-17 DIAGNOSIS — L65.9 HAIR LOSS DISORDER: ICD-10-CM

## 2020-09-17 DIAGNOSIS — E11.9 TYPE 2 DIABETES MELLITUS WITHOUT COMPLICATION, WITHOUT LONG-TERM CURRENT USE OF INSULIN (HCC): ICD-10-CM

## 2020-09-17 DIAGNOSIS — I10 BENIGN HYPERTENSION: ICD-10-CM

## 2020-09-17 LAB
ALBUMIN SERPL-MCNC: 4 G/DL (ref 3.4–5)
ALBUMIN/GLOB SERPL: 1.2 {RATIO} (ref 1–2)
ALP LIVER SERPL-CCNC: 51 U/L (ref 37–98)
ALT SERPL-CCNC: 30 U/L (ref 13–56)
ANION GAP SERPL CALC-SCNC: 6 MMOL/L (ref 0–18)
AST SERPL-CCNC: 19 U/L (ref 15–37)
BASOPHILS # BLD AUTO: 0.05 X10(3) UL (ref 0–0.2)
BASOPHILS NFR BLD AUTO: 0.7 %
BILIRUB SERPL-MCNC: 0.7 MG/DL (ref 0.1–2)
BUN BLD-MCNC: 16 MG/DL (ref 7–18)
BUN/CREAT SERPL: 24.2 (ref 10–20)
CALCIUM BLD-MCNC: 9.5 MG/DL (ref 8.5–10.1)
CHLORIDE SERPL-SCNC: 104 MMOL/L (ref 98–112)
CHOLEST SMN-MCNC: 241 MG/DL (ref ?–200)
CO2 SERPL-SCNC: 27 MMOL/L (ref 21–32)
CREAT BLD-MCNC: 0.66 MG/DL (ref 0.55–1.02)
DEPRECATED RDW RBC AUTO: 43.8 FL (ref 35.1–46.3)
EOSINOPHIL # BLD AUTO: 0.15 X10(3) UL (ref 0–0.7)
EOSINOPHIL NFR BLD AUTO: 2.1 %
ERYTHROCYTE [DISTWIDTH] IN BLOOD BY AUTOMATED COUNT: 12.7 % (ref 11–15)
EST. AVERAGE GLUCOSE BLD GHB EST-MCNC: 143 MG/DL (ref 68–126)
GLOBULIN PLAS-MCNC: 3.3 G/DL (ref 2.8–4.4)
GLUCOSE BLD-MCNC: 104 MG/DL (ref 70–99)
HBA1C MFR BLD HPLC: 6.6 % (ref ?–5.7)
HCT VFR BLD AUTO: 45.1 % (ref 35–48)
HDLC SERPL-MCNC: 51 MG/DL (ref 40–59)
HGB BLD-MCNC: 14.8 G/DL (ref 12–16)
IMM GRANULOCYTES # BLD AUTO: 0.03 X10(3) UL (ref 0–1)
IMM GRANULOCYTES NFR BLD: 0.4 %
LDLC SERPL CALC-MCNC: 159 MG/DL (ref ?–100)
LYMPHOCYTES # BLD AUTO: 2.53 X10(3) UL (ref 1–4)
LYMPHOCYTES NFR BLD AUTO: 35 %
M PROTEIN MFR SERPL ELPH: 7.3 G/DL (ref 6.4–8.2)
MCH RBC QN AUTO: 31.1 PG (ref 26–34)
MCHC RBC AUTO-ENTMCNC: 32.8 G/DL (ref 31–37)
MCV RBC AUTO: 94.7 FL (ref 80–100)
MONOCYTES # BLD AUTO: 0.84 X10(3) UL (ref 0.1–1)
MONOCYTES NFR BLD AUTO: 11.6 %
NEUTROPHILS # BLD AUTO: 3.62 X10 (3) UL (ref 1.5–7.7)
NEUTROPHILS # BLD AUTO: 3.62 X10(3) UL (ref 1.5–7.7)
NEUTROPHILS NFR BLD AUTO: 50.2 %
NONHDLC SERPL-MCNC: 190 MG/DL (ref ?–130)
OSMOLALITY SERPL CALC.SUM OF ELEC: 285 MOSM/KG (ref 275–295)
PLATELET # BLD AUTO: 274 10(3)UL (ref 150–450)
POTASSIUM SERPL-SCNC: 3.9 MMOL/L (ref 3.5–5.1)
RBC # BLD AUTO: 4.76 X10(6)UL (ref 3.8–5.3)
SODIUM SERPL-SCNC: 137 MMOL/L (ref 136–145)
T4 FREE SERPL-MCNC: 0.7 NG/DL (ref 0.8–1.7)
TRIGL SERPL-MCNC: 154 MG/DL (ref 30–149)
TSI SER-ACNC: 1.19 MIU/ML (ref 0.36–3.74)
VLDLC SERPL CALC-MCNC: 31 MG/DL (ref 0–30)
WBC # BLD AUTO: 7.2 X10(3) UL (ref 4–11)

## 2020-09-17 PROCEDURE — 83036 HEMOGLOBIN GLYCOSYLATED A1C: CPT

## 2020-09-17 PROCEDURE — 80053 COMPREHEN METABOLIC PANEL: CPT

## 2020-09-17 PROCEDURE — 3008F BODY MASS INDEX DOCD: CPT | Performed by: NURSE PRACTITIONER

## 2020-09-17 PROCEDURE — 99214 OFFICE O/P EST MOD 30 MIN: CPT | Performed by: NURSE PRACTITIONER

## 2020-09-17 PROCEDURE — 84443 ASSAY THYROID STIM HORMONE: CPT

## 2020-09-17 PROCEDURE — 36415 COLL VENOUS BLD VENIPUNCTURE: CPT

## 2020-09-17 PROCEDURE — 3078F DIAST BP <80 MM HG: CPT | Performed by: NURSE PRACTITIONER

## 2020-09-17 PROCEDURE — 3074F SYST BP LT 130 MM HG: CPT | Performed by: NURSE PRACTITIONER

## 2020-09-17 PROCEDURE — 84439 ASSAY OF FREE THYROXINE: CPT

## 2020-09-17 PROCEDURE — 80061 LIPID PANEL: CPT

## 2020-09-17 PROCEDURE — 85025 COMPLETE CBC W/AUTO DIFF WBC: CPT

## 2020-09-17 RX ORDER — ALPRAZOLAM 0.25 MG/1
1 TABLET ORAL 2 TIMES DAILY PRN
COMMUNITY
End: 2020-12-17

## 2020-09-17 NOTE — PROGRESS NOTES
HPI: HPI   Patient is here to have her thyroid checked. She has been feeling really tired and fatigued. She has been gaining weight despite having an active job. She started working at [employer] about 1 month ago.  She is very active at work and walks her Resp 18   Ht 63\"   Wt 214 lb 2 oz (97.1 kg)   SpO2 99%   BMI 37.93 kg/m²   Physical Exam    Constitutional: She is obese. She appears well-developed and well-nourished. No distress. Neck: Normal range of motion. No thyromegaly present.    Jacek Pettit

## 2020-09-18 DIAGNOSIS — I10 BENIGN HYPERTENSION: ICD-10-CM

## 2020-09-18 DIAGNOSIS — E11.9 TYPE 2 DIABETES MELLITUS WITHOUT COMPLICATION, WITHOUT LONG-TERM CURRENT USE OF INSULIN (HCC): Primary | ICD-10-CM

## 2020-09-18 DIAGNOSIS — E78.00 ELEVATED CHOLESTEROL: ICD-10-CM

## 2020-09-18 DIAGNOSIS — R63.5 WEIGHT GAIN: ICD-10-CM

## 2020-10-05 NOTE — TELEPHONE ENCOUNTER
Ov 9/17/2020 with APN; also had labs  Ov 3/26/2020 with ej    Zolpidem last filled 9/8/2020  #30  HCTZ  Last filled 6/29/2020 #90  escitalopram last filled 6/29/2020  #90

## 2020-11-11 ENCOUNTER — TELEPHONE (OUTPATIENT)
Dept: FAMILY MEDICINE CLINIC | Facility: CLINIC | Age: 45
End: 2020-11-11

## 2020-11-11 DIAGNOSIS — F41.9 ANXIETY: ICD-10-CM

## 2020-11-11 DIAGNOSIS — F99 INSOMNIA DUE TO OTHER MENTAL DISORDER: ICD-10-CM

## 2020-11-11 DIAGNOSIS — R03.0 ELEVATED BLOOD PRESSURE READING: ICD-10-CM

## 2020-11-11 DIAGNOSIS — F51.05 INSOMNIA DUE TO OTHER MENTAL DISORDER: ICD-10-CM

## 2020-11-11 RX ORDER — ESCITALOPRAM OXALATE 10 MG/1
TABLET ORAL
Qty: 90 TABLET | Refills: 0 | Status: CANCELLED | OUTPATIENT
Start: 2020-11-11

## 2020-11-11 RX ORDER — ESCITALOPRAM OXALATE 10 MG/1
TABLET ORAL
Qty: 90 TABLET | Refills: 0 | OUTPATIENT
Start: 2020-11-11

## 2020-11-11 RX ORDER — HYDROCHLOROTHIAZIDE 12.5 MG/1
CAPSULE, GELATIN COATED ORAL
Qty: 90 CAPSULE | Refills: 0 | Status: CANCELLED | OUTPATIENT
Start: 2020-11-11

## 2020-11-11 RX ORDER — HYDROCHLOROTHIAZIDE 12.5 MG/1
CAPSULE, GELATIN COATED ORAL
Qty: 90 CAPSULE | Refills: 0 | OUTPATIENT
Start: 2020-11-11

## 2020-11-11 RX ORDER — ZOLPIDEM TARTRATE 5 MG/1
5 TABLET ORAL NIGHTLY PRN
Qty: 30 TABLET | Refills: 0 | Status: CANCELLED | OUTPATIENT
Start: 2020-11-11

## 2020-11-11 NOTE — TELEPHONE ENCOUNTER
LOV 09/17/2020    LAST LAB 09/17/2020 CBC, CMP, TSH, HbgA1C, Lipid    LAST RX  Zolpidem 10/05/2020 #30  Hydrochlorothiazide 10/05/2020 #90 R0  escitolapram 10/05/2020 #90 R0    Next OV No future appointments.       PROTOCOL
children/A&W

## 2020-11-11 NOTE — TELEPHONE ENCOUNTER
LOV 09/17/2020    LAST LAB 09/17/2020 CBC, CMP, TSH, HbgA1C, Lipid    LAST RX  Zolpidem 10/05/2020 #30  Hydrochlorothiazide 10/05/2020 #90 R0  escitolapram 10/05/2020 #90 R0    Next OV No future appointments.       PROTOCOL

## 2020-11-12 RX ORDER — ZOLPIDEM TARTRATE 5 MG/1
5 TABLET ORAL NIGHTLY PRN
Qty: 30 TABLET | Refills: 0 | Status: SHIPPED | OUTPATIENT
Start: 2020-11-12 | End: 2020-12-08

## 2020-12-08 DIAGNOSIS — R03.0 ELEVATED BLOOD PRESSURE READING: ICD-10-CM

## 2020-12-08 DIAGNOSIS — F51.05 INSOMNIA DUE TO OTHER MENTAL DISORDER: ICD-10-CM

## 2020-12-08 DIAGNOSIS — F99 INSOMNIA DUE TO OTHER MENTAL DISORDER: ICD-10-CM

## 2020-12-08 DIAGNOSIS — F41.9 ANXIETY: ICD-10-CM

## 2020-12-08 RX ORDER — ESCITALOPRAM OXALATE 10 MG/1
TABLET ORAL
Qty: 90 TABLET | Refills: 0 | Status: SHIPPED | OUTPATIENT
Start: 2020-12-08 | End: 2020-12-17

## 2020-12-08 RX ORDER — HYDROCHLOROTHIAZIDE 12.5 MG/1
CAPSULE, GELATIN COATED ORAL
Qty: 90 CAPSULE | Refills: 0 | Status: SHIPPED | OUTPATIENT
Start: 2020-12-08 | End: 2021-01-11

## 2020-12-08 RX ORDER — ZOLPIDEM TARTRATE 5 MG/1
5 TABLET ORAL NIGHTLY PRN
Qty: 30 TABLET | Refills: 0 | Status: SHIPPED | OUTPATIENT
Start: 2020-12-08 | End: 2021-01-02

## 2020-12-08 NOTE — TELEPHONE ENCOUNTER
Last OV: 9/17/20 W/ [employer]  Last refill: 11/12/20  Requested Prescriptions     Pending Prescriptions Disp Refills   • zolpidem 5 MG Oral Tab 30 tablet 0     Sig: Take 1 tablet (5 mg total) by mouth nightly as needed for Sleep.      Future Appointments   D

## 2020-12-08 NOTE — TELEPHONE ENCOUNTER
LOV 09/17/2020-with Gia  03/26/2020 vika    LAST LAB 09/17/2020    LAST RX  Hydrochlorothiazide #90 R0 10/05/2020  escitalopram #90 R 0 10/05/2020    Next OV   Future Appointments   Date Time Provider [facility] Lakshmi   12/17/2020  8:00 AM Ricco Patel

## 2020-12-17 ENCOUNTER — OFFICE VISIT (OUTPATIENT)
Dept: FAMILY MEDICINE CLINIC | Facility: CLINIC | Age: 45
End: 2020-12-17
Payer: COMMERCIAL

## 2020-12-17 ENCOUNTER — LAB ENCOUNTER (OUTPATIENT)
Dept: LAB | Age: 45
End: 2020-12-17
Attending: FAMILY MEDICINE
Payer: COMMERCIAL

## 2020-12-17 VITALS
OXYGEN SATURATION: 99 % | DIASTOLIC BLOOD PRESSURE: 78 MMHG | BODY MASS INDEX: 35.61 KG/M2 | RESPIRATION RATE: 18 BRPM | WEIGHT: 201 LBS | SYSTOLIC BLOOD PRESSURE: 120 MMHG | TEMPERATURE: 99 F | HEART RATE: 73 BPM | HEIGHT: 63 IN

## 2020-12-17 DIAGNOSIS — E11.69 DIABETES MELLITUS TYPE 2 IN OBESE (HCC): ICD-10-CM

## 2020-12-17 DIAGNOSIS — Z00.00 ANNUAL PHYSICAL EXAM: Primary | ICD-10-CM

## 2020-12-17 DIAGNOSIS — E11.9 NEW ONSET TYPE 2 DIABETES MELLITUS (HCC): ICD-10-CM

## 2020-12-17 DIAGNOSIS — Z82.0 FAMILY HISTORY OF MS (MULTIPLE SCLEROSIS): ICD-10-CM

## 2020-12-17 DIAGNOSIS — E78.00 ELEVATED CHOLESTEROL: ICD-10-CM

## 2020-12-17 DIAGNOSIS — M79.7 FIBROMYALGIA: ICD-10-CM

## 2020-12-17 DIAGNOSIS — E66.9 DIABETES MELLITUS TYPE 2 IN OBESE (HCC): ICD-10-CM

## 2020-12-17 DIAGNOSIS — Z28.21 INFLUENZA VACCINATION DECLINED: ICD-10-CM

## 2020-12-17 DIAGNOSIS — M62.81 MUSCLE WEAKNESS (GENERALIZED): ICD-10-CM

## 2020-12-17 DIAGNOSIS — F41.9 ANXIETY: ICD-10-CM

## 2020-12-17 DIAGNOSIS — B35.1 ONYCHOMYCOSIS: ICD-10-CM

## 2020-12-17 DIAGNOSIS — Z01.419 ENCOUNTER FOR ANNUAL ROUTINE GYNECOLOGICAL EXAMINATION: ICD-10-CM

## 2020-12-17 DIAGNOSIS — I10 BENIGN HYPERTENSION: ICD-10-CM

## 2020-12-17 DIAGNOSIS — E11.9 TYPE 2 DIABETES MELLITUS WITHOUT COMPLICATION, WITHOUT LONG-TERM CURRENT USE OF INSULIN (HCC): ICD-10-CM

## 2020-12-17 DIAGNOSIS — R53.82 CHRONIC FATIGUE: ICD-10-CM

## 2020-12-17 PROCEDURE — 83036 HEMOGLOBIN GLYCOSYLATED A1C: CPT | Performed by: FAMILY MEDICINE

## 2020-12-17 PROCEDURE — 3074F SYST BP LT 130 MM HG: CPT | Performed by: NURSE PRACTITIONER

## 2020-12-17 PROCEDURE — 3078F DIAST BP <80 MM HG: CPT | Performed by: NURSE PRACTITIONER

## 2020-12-17 PROCEDURE — 80061 LIPID PANEL: CPT | Performed by: FAMILY MEDICINE

## 2020-12-17 PROCEDURE — 3008F BODY MASS INDEX DOCD: CPT | Performed by: NURSE PRACTITIONER

## 2020-12-17 PROCEDURE — 99396 PREV VISIT EST AGE 40-64: CPT | Performed by: NURSE PRACTITIONER

## 2020-12-17 PROCEDURE — 80053 COMPREHEN METABOLIC PANEL: CPT | Performed by: FAMILY MEDICINE

## 2020-12-17 PROCEDURE — 36415 COLL VENOUS BLD VENIPUNCTURE: CPT | Performed by: FAMILY MEDICINE

## 2020-12-17 PROCEDURE — 82570 ASSAY OF URINE CREATININE: CPT | Performed by: NURSE PRACTITIONER

## 2020-12-17 PROCEDURE — 82043 UR ALBUMIN QUANTITATIVE: CPT | Performed by: NURSE PRACTITIONER

## 2020-12-17 PROCEDURE — 88175 CYTOPATH C/V AUTO FLUID REDO: CPT | Performed by: NURSE PRACTITIONER

## 2020-12-17 PROCEDURE — 87624 HPV HI-RISK TYP POOLED RSLT: CPT | Performed by: NURSE PRACTITIONER

## 2020-12-17 RX ORDER — ESCITALOPRAM OXALATE 10 MG/1
20 TABLET ORAL DAILY
Qty: 90 TABLET | Refills: 0 | COMMUNITY
Start: 2020-12-17 | End: 2021-01-02

## 2020-12-17 NOTE — PROGRESS NOTES
HPI: HPI   Patient is here for physical and to discuss issues. Has been having worsening muscle pains, back aches, shoulder pain and extreme fatigue for past 3 months. Did see rheumatology in the past and was diagnosed with fibromyalgia.  She tried [employer] Diagnosis Date   • Anxiety    • Fibromyalgia 02/2019   • Insomnia       Past Surgical History:   Procedure Laterality Date   • ENDOMETRIAL ABLATION        No family history on file.    Social History:   Social History    Tobacco Use      Smoking status: N oz)  Adjusted ideal body weight: 67.9 kg (149 lb 11.4 oz)   Physical Exam    Constitutional: She is oriented to person, place, and time and obese. She appears well-developed and well-nourished. No distress.    HENT:   Right Ear: Tympanic membrane and ear ca PODIATRY - EXTERNAL    Influenza vaccination declined

## 2020-12-18 ENCOUNTER — TELEPHONE (OUTPATIENT)
Dept: FAMILY MEDICINE CLINIC | Facility: CLINIC | Age: 45
End: 2020-12-18

## 2020-12-18 NOTE — TELEPHONE ENCOUNTER
[facility] is calling they need a face sheet sent over with this patients , insurance information and why she is being seen faxed to [phone]

## 2020-12-19 DIAGNOSIS — E11.69 DIABETES MELLITUS TYPE 2 IN OBESE (HCC): Primary | ICD-10-CM

## 2020-12-19 DIAGNOSIS — I10 BENIGN HYPERTENSION: ICD-10-CM

## 2020-12-19 DIAGNOSIS — M62.81 MUSCLE WEAKNESS (GENERALIZED): ICD-10-CM

## 2020-12-19 DIAGNOSIS — E66.9 DIABETES MELLITUS TYPE 2 IN OBESE (HCC): Primary | ICD-10-CM

## 2020-12-19 DIAGNOSIS — M79.7 FIBROMYALGIA: ICD-10-CM

## 2021-01-02 DIAGNOSIS — F41.9 ANXIETY: ICD-10-CM

## 2021-01-02 DIAGNOSIS — R03.0 ELEVATED BLOOD PRESSURE READING: ICD-10-CM

## 2021-01-02 DIAGNOSIS — F51.05 INSOMNIA DUE TO OTHER MENTAL DISORDER: ICD-10-CM

## 2021-01-02 DIAGNOSIS — F99 INSOMNIA DUE TO OTHER MENTAL DISORDER: ICD-10-CM

## 2021-01-04 RX ORDER — ESCITALOPRAM OXALATE 10 MG/1
20 TABLET ORAL DAILY
Qty: 90 TABLET | Refills: 0 | Status: SHIPPED | OUTPATIENT
Start: 2021-01-04 | End: 2021-01-18

## 2021-01-04 RX ORDER — HYDROCHLOROTHIAZIDE 12.5 MG/1
CAPSULE, GELATIN COATED ORAL
Qty: 90 CAPSULE | Refills: 0 | OUTPATIENT
Start: 2021-01-04

## 2021-01-04 RX ORDER — ZOLPIDEM TARTRATE 5 MG/1
5 TABLET ORAL NIGHTLY PRN
Qty: 30 TABLET | Refills: 0 | Status: SHIPPED | OUTPATIENT
Start: 2021-01-04 | End: 2021-02-09

## 2021-01-04 NOTE — TELEPHONE ENCOUNTER
Last refill #90 on 12/8/2020 - dose was increased to 20mg on 12/17/2020    Last refill on ambien #30 on 12/8/2020    Last refill on hctz #90 on 12/8/2020 -  Refill too soon.   Enough medication until 3/8/2021    Last office visit on 12/17/2020  Future Appoi

## 2021-01-11 DIAGNOSIS — R03.0 ELEVATED BLOOD PRESSURE READING: ICD-10-CM

## 2021-01-11 RX ORDER — HYDROCHLOROTHIAZIDE 12.5 MG/1
CAPSULE, GELATIN COATED ORAL
Qty: 90 CAPSULE | Refills: 0 | Status: SHIPPED | OUTPATIENT
Start: 2021-01-11 | End: 2021-04-05

## 2021-01-18 NOTE — TELEPHONE ENCOUNTER
escitalopram 20 mg please call into [facility] [place] changed her dose to 20 mg and she is out of this medication was not able to take it last night please call in today.

## 2021-02-08 DIAGNOSIS — F51.05 INSOMNIA DUE TO OTHER MENTAL DISORDER: ICD-10-CM

## 2021-02-08 DIAGNOSIS — F99 INSOMNIA DUE TO OTHER MENTAL DISORDER: ICD-10-CM

## 2021-02-08 NOTE — TELEPHONE ENCOUNTER
Last refill - 1/4/21 - #30   Last office visit - 12/17/20 - with Himanshu Nixon for annual physical exam

## 2021-02-08 NOTE — TELEPHONE ENCOUNTER
Last refill #30 on 1/4/2021  Last office visit pertaining to refill on 12/17/2020 - cpx  No future appointments.

## 2021-02-09 RX ORDER — ZOLPIDEM TARTRATE 5 MG/1
5 TABLET ORAL NIGHTLY PRN
Qty: 30 TABLET | Refills: 0 | Status: SHIPPED | OUTPATIENT
Start: 2021-02-09 | End: 2021-03-07

## 2021-02-09 RX ORDER — ZOLPIDEM TARTRATE 5 MG/1
5 TABLET ORAL NIGHTLY PRN
Qty: 30 TABLET | Refills: 0 | Status: CANCELLED | OUTPATIENT
Start: 2021-02-09

## 2021-02-27 DIAGNOSIS — F41.9 ANXIETY: ICD-10-CM

## 2021-03-01 RX ORDER — ESCITALOPRAM OXALATE 10 MG/1
TABLET ORAL
Qty: 90 TABLET | Refills: 0 | Status: SHIPPED | OUTPATIENT
Start: 2021-03-01 | End: 2021-06-29

## 2021-03-01 NOTE — TELEPHONE ENCOUNTER
Last refill #90 on 1/18/2021  Last office visit pertaining to refill on 12/17/2020  No future appointments.

## 2021-03-07 DIAGNOSIS — F51.05 INSOMNIA DUE TO OTHER MENTAL DISORDER: ICD-10-CM

## 2021-03-07 DIAGNOSIS — R03.0 ELEVATED BLOOD PRESSURE READING: ICD-10-CM

## 2021-03-07 DIAGNOSIS — F99 INSOMNIA DUE TO OTHER MENTAL DISORDER: ICD-10-CM

## 2021-03-08 RX ORDER — HYDROCHLOROTHIAZIDE 12.5 MG/1
CAPSULE, GELATIN COATED ORAL
Qty: 90 CAPSULE | Refills: 0 | OUTPATIENT
Start: 2021-03-08

## 2021-03-08 RX ORDER — ZOLPIDEM TARTRATE 5 MG/1
5 TABLET ORAL NIGHTLY PRN
Qty: 30 TABLET | Refills: 0 | Status: SHIPPED | OUTPATIENT
Start: 2021-03-08 | End: 2021-04-05

## 2021-03-08 NOTE — TELEPHONE ENCOUNTER
Last refill #90 on 1/11/2021  Refill requested too soon  Next refill due on or around 4/11/2021  Refill denied.

## 2021-03-08 NOTE — TELEPHONE ENCOUNTER
Last refill #30 on 2/9/2021  Last office visit pertaining to refill on 12/17/2020  No future appointments.

## 2021-03-12 DIAGNOSIS — R03.0 ELEVATED BLOOD PRESSURE READING: ICD-10-CM

## 2021-03-15 RX ORDER — HYDROCHLOROTHIAZIDE 12.5 MG/1
CAPSULE, GELATIN COATED ORAL
Qty: 90 CAPSULE | Refills: 0 | OUTPATIENT
Start: 2021-03-15

## 2021-03-15 NOTE — TELEPHONE ENCOUNTER
Last refill #90 on 1/11/2021  Patient should have enough medication until 4/11/2021  Refill denied. - refills remain. [employer] message sent.

## 2021-04-04 DIAGNOSIS — F51.05 INSOMNIA DUE TO OTHER MENTAL DISORDER: ICD-10-CM

## 2021-04-04 DIAGNOSIS — R03.0 ELEVATED BLOOD PRESSURE READING: ICD-10-CM

## 2021-04-04 DIAGNOSIS — F99 INSOMNIA DUE TO OTHER MENTAL DISORDER: ICD-10-CM

## 2021-04-05 RX ORDER — ZOLPIDEM TARTRATE 5 MG/1
TABLET ORAL
Qty: 30 TABLET | Refills: 0 | Status: SHIPPED | OUTPATIENT
Start: 2021-04-05 | End: 2021-05-10

## 2021-04-05 RX ORDER — HYDROCHLOROTHIAZIDE 12.5 MG/1
CAPSULE, GELATIN COATED ORAL
Qty: 90 CAPSULE | Refills: 0 | Status: SHIPPED | OUTPATIENT
Start: 2021-04-05 | End: 2021-05-10

## 2021-04-05 NOTE — TELEPHONE ENCOUNTER
Last refill #90 on 1/11/2021  Last office visit pertaining to refill on 12/17/2020 - cpx  No future appointments.   BP Readings from Last 3 Encounters:  12/17/20 : 120/78  09/17/20 : 118/78  03/26/20 : 114/74    Labs current until 6/2021  Refilled per bianca

## 2021-05-10 DIAGNOSIS — F99 INSOMNIA DUE TO OTHER MENTAL DISORDER: ICD-10-CM

## 2021-05-10 DIAGNOSIS — R03.0 ELEVATED BLOOD PRESSURE READING: ICD-10-CM

## 2021-05-10 DIAGNOSIS — F51.05 INSOMNIA DUE TO OTHER MENTAL DISORDER: ICD-10-CM

## 2021-05-11 RX ORDER — ZOLPIDEM TARTRATE 5 MG/1
5 TABLET ORAL NIGHTLY PRN
Qty: 30 TABLET | Refills: 0 | Status: SHIPPED | OUTPATIENT
Start: 2021-05-11 | End: 2021-06-09

## 2021-05-11 RX ORDER — HYDROCHLOROTHIAZIDE 12.5 MG/1
12.5 CAPSULE, GELATIN COATED ORAL DAILY
Qty: 90 CAPSULE | Refills: 0 | Status: SHIPPED | OUTPATIENT
Start: 2021-05-11 | End: 2021-06-29

## 2021-05-11 NOTE — TELEPHONE ENCOUNTER
LOV 12/17/2020    LAST LAB 12/17/2020    LAST RX  Hydrochlorothiazide #90 R0 04/05/2021    Next OV No future appointments.     PROTOCOL

## 2021-05-11 NOTE — TELEPHONE ENCOUNTER
Last refill #30 on 4/5/2021  Last office visit pertaining to refill on 12/17/2020  No future appointments.

## 2021-05-22 DIAGNOSIS — F41.9 ANXIETY: ICD-10-CM

## 2021-05-24 RX ORDER — ESCITALOPRAM OXALATE 20 MG/1
20 TABLET ORAL DAILY
Qty: 90 TABLET | Refills: 0 | Status: SHIPPED | OUTPATIENT
Start: 2021-05-24 | End: 2021-11-11

## 2021-06-09 DIAGNOSIS — F51.05 INSOMNIA DUE TO OTHER MENTAL DISORDER: ICD-10-CM

## 2021-06-09 DIAGNOSIS — F99 INSOMNIA DUE TO OTHER MENTAL DISORDER: ICD-10-CM

## 2021-06-10 RX ORDER — ZOLPIDEM TARTRATE 5 MG/1
5 TABLET ORAL NIGHTLY PRN
Qty: 30 TABLET | Refills: 0 | Status: SHIPPED | OUTPATIENT
Start: 2021-06-10 | End: 2021-07-07

## 2021-06-10 NOTE — TELEPHONE ENCOUNTER
Last refill #30 on 5/11/2021  Last office visit pertaining to refill on 12/17/2020 -physical  No future appointments.

## 2021-06-29 ENCOUNTER — OFFICE VISIT (OUTPATIENT)
Dept: FAMILY MEDICINE CLINIC | Facility: CLINIC | Age: 46
End: 2021-06-29
Payer: COMMERCIAL

## 2021-06-29 ENCOUNTER — LAB ENCOUNTER (OUTPATIENT)
Dept: LAB | Age: 46
End: 2021-06-29
Attending: FAMILY MEDICINE
Payer: COMMERCIAL

## 2021-06-29 VITALS
BODY MASS INDEX: 37.92 KG/M2 | HEART RATE: 72 BPM | TEMPERATURE: 98 F | OXYGEN SATURATION: 97 % | DIASTOLIC BLOOD PRESSURE: 80 MMHG | RESPIRATION RATE: 16 BRPM | HEIGHT: 63 IN | WEIGHT: 214 LBS | SYSTOLIC BLOOD PRESSURE: 118 MMHG

## 2021-06-29 DIAGNOSIS — I10 BENIGN HYPERTENSION: ICD-10-CM

## 2021-06-29 DIAGNOSIS — E66.01 CLASS 2 SEVERE OBESITY DUE TO EXCESS CALORIES WITH SERIOUS COMORBIDITY AND BODY MASS INDEX (BMI) OF 37.0 TO 37.9 IN ADULT (HCC): ICD-10-CM

## 2021-06-29 DIAGNOSIS — M79.7 FIBROMYALGIA: ICD-10-CM

## 2021-06-29 DIAGNOSIS — E66.9 DIABETES MELLITUS TYPE 2 IN OBESE (HCC): Primary | ICD-10-CM

## 2021-06-29 DIAGNOSIS — E11.69 DIABETES MELLITUS TYPE 2 IN OBESE (HCC): Primary | ICD-10-CM

## 2021-06-29 DIAGNOSIS — H10.13 ALLERGIC CONJUNCTIVITIS OF BOTH EYES: ICD-10-CM

## 2021-06-29 PROBLEM — E66.812 CLASS 2 SEVERE OBESITY DUE TO EXCESS CALORIES WITH SERIOUS COMORBIDITY AND BODY MASS INDEX (BMI) OF 37.0 TO 37.9 IN ADULT (HCC): Status: ACTIVE | Noted: 2021-06-29

## 2021-06-29 PROCEDURE — 99214 OFFICE O/P EST MOD 30 MIN: CPT | Performed by: FAMILY MEDICINE

## 2021-06-29 PROCEDURE — 3008F BODY MASS INDEX DOCD: CPT | Performed by: FAMILY MEDICINE

## 2021-06-29 PROCEDURE — 3074F SYST BP LT 130 MM HG: CPT | Performed by: FAMILY MEDICINE

## 2021-06-29 PROCEDURE — 3051F HG A1C>EQUAL 7.0%<8.0%: CPT | Performed by: INTERNAL MEDICINE

## 2021-06-29 PROCEDURE — 3079F DIAST BP 80-89 MM HG: CPT | Performed by: FAMILY MEDICINE

## 2021-06-29 RX ORDER — OLOPATADINE HYDROCHLORIDE 1 MG/ML
1 SOLUTION/ DROPS OPHTHALMIC 2 TIMES DAILY
Qty: 1 EACH | Refills: 1 | Status: SHIPPED | OUTPATIENT
Start: 2021-06-29

## 2021-06-29 NOTE — PROGRESS NOTES
HPI/Subjective:   Patient ID: Deborah Simmonds is a 54year old female. Patient with complaints of itchy, watery eyes. Without redness. Without change in vision. Without trauma. Concern with weight. Difficulty in losing.   Has tried multiple diets and hypertension  Class 2 severe obesity due to excess calories with serious comorbidity and body mass index (BMI) of 37.0 to 37.9 in adult [facility])    No orders of the defined types were placed in this encounter.     25 minutes spent with counseling, positive men

## 2021-07-07 DIAGNOSIS — F99 INSOMNIA DUE TO OTHER MENTAL DISORDER: ICD-10-CM

## 2021-07-07 DIAGNOSIS — F51.05 INSOMNIA DUE TO OTHER MENTAL DISORDER: ICD-10-CM

## 2021-07-08 RX ORDER — ZOLPIDEM TARTRATE 5 MG/1
5 TABLET ORAL NIGHTLY PRN
Qty: 30 TABLET | Refills: 0 | Status: SHIPPED | OUTPATIENT
Start: 2021-07-08 | End: 2021-08-08

## 2021-07-08 NOTE — TELEPHONE ENCOUNTER
Last refill #30 on 6/10/2021  Last office visit pertaining to refill on 6/29/2021  Future Appointments   Date Time Provider Maria Guadalupe Martins   9/30/2021  1:00 PM Vannesa Bailey MD [address]

## 2021-08-08 DIAGNOSIS — F99 INSOMNIA DUE TO OTHER MENTAL DISORDER: ICD-10-CM

## 2021-08-08 DIAGNOSIS — F51.05 INSOMNIA DUE TO OTHER MENTAL DISORDER: ICD-10-CM

## 2021-08-09 RX ORDER — ZOLPIDEM TARTRATE 5 MG/1
5 TABLET ORAL NIGHTLY PRN
Qty: 30 TABLET | Refills: 0 | Status: SHIPPED | OUTPATIENT
Start: 2021-08-09 | End: 2021-09-07

## 2021-08-11 ENCOUNTER — PATIENT MESSAGE (OUTPATIENT)
Dept: FAMILY MEDICINE CLINIC | Facility: CLINIC | Age: 46
End: 2021-08-11

## 2021-08-11 NOTE — TELEPHONE ENCOUNTER
From: Florencio Been  To: Arron Chacon DO  Sent: 8/11/2021  2:26 PM CDT  Subject: Other    Dr. Maximino Goldberg had a bad headache for a few days , but today i started spotting. I had an ablation in 2018 , I've not spotted since 2018.   It was darker b

## 2021-09-07 DIAGNOSIS — F99 INSOMNIA DUE TO OTHER MENTAL DISORDER: ICD-10-CM

## 2021-09-07 DIAGNOSIS — F51.05 INSOMNIA DUE TO OTHER MENTAL DISORDER: ICD-10-CM

## 2021-09-08 RX ORDER — ZOLPIDEM TARTRATE 5 MG/1
5 TABLET ORAL NIGHTLY PRN
Qty: 30 TABLET | Refills: 0 | Status: SHIPPED | OUTPATIENT
Start: 2021-09-08 | End: 2021-10-11

## 2021-09-08 NOTE — TELEPHONE ENCOUNTER
LOV 06/29/2021        LAST RX 08/09/2021 30 tablets 0 refills    Next OV   Future Appointments   Date Time Provider Maria Guadalupe Martins   9/30/2021  1:00 PM Du Rodriguez MD [facility]   10/11/2021  3:45 PM Cliff Douglas DO [facility]

## 2021-09-15 ENCOUNTER — OFFICE VISIT (OUTPATIENT)
Dept: FAMILY MEDICINE CLINIC | Facility: CLINIC | Age: 46
End: 2021-09-15
Payer: COMMERCIAL

## 2021-09-15 VITALS
OXYGEN SATURATION: 98 % | WEIGHT: 220.38 LBS | RESPIRATION RATE: 16 BRPM | DIASTOLIC BLOOD PRESSURE: 76 MMHG | HEIGHT: 63 IN | BODY MASS INDEX: 39.05 KG/M2 | HEART RATE: 73 BPM | SYSTOLIC BLOOD PRESSURE: 122 MMHG | TEMPERATURE: 100 F

## 2021-09-15 DIAGNOSIS — J02.9 PHARYNGITIS, UNSPECIFIED ETIOLOGY: Primary | ICD-10-CM

## 2021-09-15 PROCEDURE — 3074F SYST BP LT 130 MM HG: CPT | Performed by: INTERNAL MEDICINE

## 2021-09-15 PROCEDURE — 99214 OFFICE O/P EST MOD 30 MIN: CPT | Performed by: INTERNAL MEDICINE

## 2021-09-15 PROCEDURE — 3008F BODY MASS INDEX DOCD: CPT | Performed by: INTERNAL MEDICINE

## 2021-09-15 PROCEDURE — 3078F DIAST BP <80 MM HG: CPT | Performed by: INTERNAL MEDICINE

## 2021-09-15 RX ORDER — LEVOFLOXACIN 500 MG/1
500 TABLET, FILM COATED ORAL DAILY
Qty: 10 TABLET | Refills: 0 | Status: SHIPPED | OUTPATIENT
Start: 2021-09-15 | End: 2021-09-25

## 2021-09-15 NOTE — PROGRESS NOTES
HPI:   Teja Ortiz is a 54year old female who presents for upper respiratory symptoms for  2  days. Patient reports congestion, dry cough, sinus pain, OTC cold meds have not been helping.     Current Outpatient Medications   Medication Sig Dispense Re murmur  GI: good BS's,no masses, HSM or tenderness    ASSESSMENT AND PLAN:   Susan Sorto is a 54year old female who presents with Pharyngitis.  PLAN: OTC decongestants, throat lozenges and tylenol and covid testing, empiric levaquin 500 mg DAILY x 10

## 2021-09-17 LAB — SARS-COV-2 RNA RESP QL NAA+PROBE: NOT DETECTED

## 2021-09-21 RX ORDER — FLUCONAZOLE 150 MG/1
150 TABLET ORAL ONCE
Qty: 1 TABLET | Refills: 0 | Status: SHIPPED | OUTPATIENT
Start: 2021-09-21 | End: 2021-09-21

## 2021-09-27 RX ORDER — LEVOFLOXACIN 500 MG/1
500 TABLET, FILM COATED ORAL DAILY
Qty: 10 TABLET | Refills: 0 | OUTPATIENT
Start: 2021-09-27 | End: 2021-10-07

## 2021-09-27 NOTE — TELEPHONE ENCOUNTER
LOV 09/15/2021    LAST LAB   Component   Ref Range & Units 6/29/21  4:14 PM   HgbA1C   <5.7 % 7.1 High          LAST RX 09/08/2021 30 tablets 0 refills    Next OV   Future Appointments   Date Time Provider Maria Guadalupe Martins   10/11/2021  3:45 PM Donovan Prajapati, Er

## 2021-10-02 RX ORDER — ATORVASTATIN CALCIUM 20 MG/1
20 TABLET, FILM COATED ORAL NIGHTLY
Qty: 90 TABLET | Refills: 0 | Status: SHIPPED | OUTPATIENT
Start: 2021-10-02

## 2021-10-02 NOTE — TELEPHONE ENCOUNTER
Last refill: 06/30/21  Qty: 90  W/ 0 refills  Last ov: 06/29/21    Requested Prescriptions     Pending Prescriptions Disp Refills   • atorvastatin (LIPITOR) 20 MG Oral Tab 90 tablet 0     Sig: Take 1 tablet (20 mg total) by mouth nightly.      Future Appoin

## 2021-10-11 DIAGNOSIS — F51.05 INSOMNIA DUE TO OTHER MENTAL DISORDER: ICD-10-CM

## 2021-10-11 DIAGNOSIS — F99 INSOMNIA DUE TO OTHER MENTAL DISORDER: ICD-10-CM

## 2021-10-12 RX ORDER — ZOLPIDEM TARTRATE 5 MG/1
5 TABLET ORAL NIGHTLY PRN
Qty: 30 TABLET | Refills: 0 | Status: SHIPPED | OUTPATIENT
Start: 2021-10-12 | End: 2021-11-11

## 2021-10-12 NOTE — TELEPHONE ENCOUNTER
Last refill: 09/08/21  Qty: 30  W/ 0 refills  Last ov:  06/29/21    Requested Prescriptions     Pending Prescriptions Disp Refills   • zolpidem 5 MG Oral Tab 30 tablet 0     Sig: Take 1 tablet (5 mg total) by mouth nightly as needed for Sleep.      No futur

## 2021-11-11 DIAGNOSIS — F51.05 INSOMNIA DUE TO OTHER MENTAL DISORDER: ICD-10-CM

## 2021-11-11 DIAGNOSIS — F41.9 ANXIETY: ICD-10-CM

## 2021-11-11 DIAGNOSIS — F99 INSOMNIA DUE TO OTHER MENTAL DISORDER: ICD-10-CM

## 2021-11-11 RX ORDER — ESCITALOPRAM OXALATE 20 MG/1
20 TABLET ORAL DAILY
Qty: 90 TABLET | Refills: 0 | Status: SHIPPED | OUTPATIENT
Start: 2021-11-11

## 2021-11-11 RX ORDER — ZOLPIDEM TARTRATE 5 MG/1
5 TABLET ORAL NIGHTLY PRN
Qty: 30 TABLET | Refills: 0 | Status: SHIPPED | OUTPATIENT
Start: 2021-11-11 | End: 2021-12-17

## 2021-11-11 NOTE — TELEPHONE ENCOUNTER
Last refill:  10/12/21  Qty: 30  W/ 0 refills  Last ov:  06/29/21    Requested Prescriptions     Pending Prescriptions Disp Refills   • zolpidem 5 MG Oral Tab 30 tablet 0     Sig: Take 1 tablet (5 mg total) by mouth nightly as needed for Sleep.      No futu

## 2021-11-11 NOTE — TELEPHONE ENCOUNTER
Requested Prescriptions     Pending Prescriptions Disp Refills   • escitalopram 20 MG Oral Tab 90 tablet 0     Sig: Take 1 tablet (20 mg total) by mouth daily.      Last refill #90 on 5/24/2021  Last office visit pertaining to refill on 6/29/2021  No future

## 2021-12-10 ENCOUNTER — LAB ENCOUNTER (OUTPATIENT)
Dept: LAB | Age: 46
End: 2021-12-10
Attending: FAMILY MEDICINE
Payer: COMMERCIAL

## 2021-12-10 DIAGNOSIS — E78.00 ELEVATED CHOLESTEROL: ICD-10-CM

## 2021-12-10 DIAGNOSIS — E11.69 DIABETES MELLITUS TYPE 2 IN OBESE (HCC): ICD-10-CM

## 2021-12-10 DIAGNOSIS — I10 BENIGN HYPERTENSION: ICD-10-CM

## 2021-12-10 DIAGNOSIS — E66.01 CLASS 2 SEVERE OBESITY DUE TO EXCESS CALORIES WITH SERIOUS COMORBIDITY AND BODY MASS INDEX (BMI) OF 37.0 TO 37.9 IN ADULT (HCC): ICD-10-CM

## 2021-12-10 DIAGNOSIS — E66.9 DIABETES MELLITUS TYPE 2 IN OBESE (HCC): ICD-10-CM

## 2021-12-10 PROCEDURE — 80053 COMPREHEN METABOLIC PANEL: CPT

## 2021-12-10 PROCEDURE — 80061 LIPID PANEL: CPT

## 2021-12-10 PROCEDURE — 36415 COLL VENOUS BLD VENIPUNCTURE: CPT

## 2021-12-10 PROCEDURE — 83036 HEMOGLOBIN GLYCOSYLATED A1C: CPT

## 2021-12-13 ENCOUNTER — TELEPHONE (OUTPATIENT)
Dept: FAMILY MEDICINE CLINIC | Facility: CLINIC | Age: 46
End: 2021-12-13

## 2021-12-13 DIAGNOSIS — E11.69 DIABETES MELLITUS TYPE 2 IN OBESE (HCC): Primary | ICD-10-CM

## 2021-12-13 DIAGNOSIS — E66.9 DIABETES MELLITUS TYPE 2 IN OBESE (HCC): Primary | ICD-10-CM

## 2021-12-13 NOTE — TELEPHONE ENCOUNTER
----- Message from Wilfred Chavez DO sent at 12/11/2021  7:19 AM CST -----  Poor diabetes control. Increase Metformin to 1000 mg twice daily. Recheck same lab in 3 months. Recommend diabetic Ed.

## 2021-12-14 NOTE — TELEPHONE ENCOUNTER
Discussed metformin dose increase. She is unable to take any time off work at this time, so she will decline on diabetic ed. She will go back on her keto diet. Instructed recheck fasting labs in 3 months. Expresses understanding.

## 2021-12-14 NOTE — TELEPHONE ENCOUNTER
----- Message from Maru Maldonado sent at 12/13/2021  3:42 PM CST -----  Pt. Called back.  [phone]

## 2021-12-17 DIAGNOSIS — F99 INSOMNIA DUE TO OTHER MENTAL DISORDER: ICD-10-CM

## 2021-12-17 DIAGNOSIS — F51.05 INSOMNIA DUE TO OTHER MENTAL DISORDER: ICD-10-CM

## 2021-12-20 RX ORDER — ZOLPIDEM TARTRATE 5 MG/1
5 TABLET ORAL NIGHTLY PRN
Qty: 30 TABLET | Refills: 0 | Status: SHIPPED | OUTPATIENT
Start: 2021-12-20

## 2021-12-20 NOTE — TELEPHONE ENCOUNTER
Last refill: 11/11/21  Qty: 30  W/ 0 refills  Last ov: 06/29/21    Requested Prescriptions     Pending Prescriptions Disp Refills   • zolpidem 5 MG Oral Tab 30 tablet 0     Sig: Take 1 tablet (5 mg total) by mouth nightly as needed for Sleep.      No future

## 2022-01-05 ENCOUNTER — OFFICE VISIT (OUTPATIENT)
Dept: FAMILY MEDICINE CLINIC | Facility: CLINIC | Age: 47
End: 2022-01-05
Payer: COMMERCIAL

## 2022-01-05 VITALS
WEIGHT: 216 LBS | BODY MASS INDEX: 38 KG/M2 | RESPIRATION RATE: 16 BRPM | HEART RATE: 91 BPM | OXYGEN SATURATION: 98 % | DIASTOLIC BLOOD PRESSURE: 74 MMHG | SYSTOLIC BLOOD PRESSURE: 126 MMHG | TEMPERATURE: 98 F

## 2022-01-05 DIAGNOSIS — R05.9 COUGH: Primary | ICD-10-CM

## 2022-01-05 PROCEDURE — 3078F DIAST BP <80 MM HG: CPT | Performed by: INTERNAL MEDICINE

## 2022-01-05 PROCEDURE — 99214 OFFICE O/P EST MOD 30 MIN: CPT | Performed by: INTERNAL MEDICINE

## 2022-01-05 PROCEDURE — 3074F SYST BP LT 130 MM HG: CPT | Performed by: INTERNAL MEDICINE

## 2022-01-05 RX ORDER — LEVOFLOXACIN 500 MG/1
500 TABLET, FILM COATED ORAL DAILY
Qty: 10 TABLET | Refills: 0 | Status: SHIPPED | OUTPATIENT
Start: 2022-01-05 | End: 2022-01-15

## 2022-01-05 NOTE — PROGRESS NOTES
HPI:   Tj Church is a 51year old female who presents for upper respiratory symptoms for  7  days. Patient reports congestion, dry cough, cough is keeping pt up at night, chest pain from coughing, sinus pain, OTC cold meds have not been helping. clear  HEENT: atraumatic, normocephalic,ears and throat are clear  NECK: supple,no adenopathy,no bruits  LUNGS: clear to auscultation  CARDIO: RRR without murmur  GI: good BS's,no masses, HSM or tenderness    ASSESSMENT AND PLAN:   Daniela nye a 51

## 2022-01-07 LAB — SARS-COV-2 RNA RESP QL NAA+PROBE: DETECTED

## 2022-04-26 RX ORDER — ZOLPIDEM TARTRATE 5 MG/1
5 TABLET ORAL NIGHTLY PRN
Qty: 30 TABLET | Refills: 0 | Status: SHIPPED | OUTPATIENT
Start: 2022-04-26

## 2022-05-09 ENCOUNTER — LAB ENCOUNTER (OUTPATIENT)
Dept: LAB | Age: 47
End: 2022-05-09
Attending: FAMILY MEDICINE
Payer: COMMERCIAL

## 2022-05-09 PROBLEM — F32.1 CURRENT MODERATE EPISODE OF MAJOR DEPRESSIVE DISORDER (HCC): Status: ACTIVE | Noted: 2022-05-09

## 2022-05-09 NOTE — PATIENT INSTRUCTIONS
Continue with diet, weight loss. Continue medications as directed. Sleep evaluation. Call with laboratory studies. Call with questions or problems.

## 2022-05-12 RX ORDER — ATORVASTATIN CALCIUM 10 MG/1
10 TABLET, FILM COATED ORAL NIGHTLY
Qty: 90 TABLET | Refills: 0 | Status: SHIPPED | OUTPATIENT
Start: 2022-05-12

## 2022-05-17 PROBLEM — F13.20 SEDATIVE, HYPNOTIC OR ANXIOLYTIC DEPENDENCE, UNCOMPLICATED (HCC): Status: ACTIVE | Noted: 2022-05-17

## 2022-05-17 NOTE — PATIENT INSTRUCTIONS
Take Lexapro 10 mg, half pill daily for 2 weeks. Then may resume 20 mg daily. Call with questions or problems. Xanax as needed anxiety. Not to be used daily. Follow-up in 3 to 4 weeks. Sooner if problems.

## 2022-05-20 ENCOUNTER — LABORATORY ENCOUNTER (OUTPATIENT)
Dept: LAB | Age: 47
End: 2022-05-20
Attending: FAMILY MEDICINE
Payer: COMMERCIAL

## 2022-05-20 DIAGNOSIS — Z86.2 HISTORY OF IRON DEFICIENCY ANEMIA: ICD-10-CM

## 2022-05-20 DIAGNOSIS — F41.9 ANXIETY: ICD-10-CM

## 2022-05-20 LAB
BASOPHILS # BLD AUTO: 0.03 X10(3) UL (ref 0–0.2)
BASOPHILS NFR BLD AUTO: 0.2 %
DEPRECATED HBV CORE AB SER IA-ACNC: 65.4 NG/ML
EOSINOPHIL # BLD AUTO: 0.16 X10(3) UL (ref 0–0.7)
EOSINOPHIL NFR BLD AUTO: 1.3 %
ERYTHROCYTE [DISTWIDTH] IN BLOOD BY AUTOMATED COUNT: 13.1 %
HCT VFR BLD AUTO: 46.7 %
HGB BLD-MCNC: 15.2 G/DL
IMM GRANULOCYTES # BLD AUTO: 0.04 X10(3) UL (ref 0–1)
IMM GRANULOCYTES NFR BLD: 0.3 %
LYMPHOCYTES # BLD AUTO: 3.08 X10(3) UL (ref 1–4)
LYMPHOCYTES NFR BLD AUTO: 25 %
MCH RBC QN AUTO: 31.1 PG (ref 26–34)
MCHC RBC AUTO-ENTMCNC: 32.5 G/DL (ref 31–37)
MCV RBC AUTO: 95.5 FL
MONOCYTES # BLD AUTO: 0.92 X10(3) UL (ref 0.1–1)
MONOCYTES NFR BLD AUTO: 7.5 %
NEUTROPHILS # BLD AUTO: 8.11 X10 (3) UL (ref 1.5–7.7)
NEUTROPHILS # BLD AUTO: 8.11 X10(3) UL (ref 1.5–7.7)
NEUTROPHILS NFR BLD AUTO: 65.7 %
PLATELET # BLD AUTO: 345 10(3)UL (ref 150–450)
RBC # BLD AUTO: 4.89 X10(6)UL
WBC # BLD AUTO: 12.3 X10(3) UL (ref 4–11)

## 2022-05-20 PROCEDURE — 36415 COLL VENOUS BLD VENIPUNCTURE: CPT

## 2022-05-20 PROCEDURE — 85025 COMPLETE CBC W/AUTO DIFF WBC: CPT

## 2022-05-20 PROCEDURE — 82728 ASSAY OF FERRITIN: CPT

## 2022-05-20 NOTE — TELEPHONE ENCOUNTER
PT IS HAVING VERY BAD ANXIETY, SEEMS TO BE GETTING WORSE NO MATTER WHAT SHE TRIES. MIND SEEMS CALM BUT HER BODY IS IN PANIC MODE.        PLEASE ADVISE-THANK YOU

## 2022-05-20 NOTE — TELEPHONE ENCOUNTER
Patient having severe anxiety since Tuesday; can't leave house. She has taken Xanax at 5:30am and 9:40am.  Slept good last night. She asks if her iron levels were checked with blood work. Told her no. She has history of iron deficiency anemia; family members have this bad in her family. Low iron can cause anxiety. Maybe she should have it checked? She has whole food iron supplement 22mg; she took a dose 1 week ago. Reported to Dr Jimmie Crowell. Advised- draw cbc and ferritin today. Doctor feels anxiety is from abruptly stopping escitalopram.  She has restarted it at 10 mg daily for 7 days, then increase to 20 mg daily. Expresses understanding.

## 2022-06-11 ENCOUNTER — OFFICE VISIT (OUTPATIENT)
Dept: FAMILY MEDICINE CLINIC | Facility: CLINIC | Age: 47
End: 2022-06-11
Payer: COMMERCIAL

## 2022-06-11 VITALS
TEMPERATURE: 98 F | HEART RATE: 79 BPM | OXYGEN SATURATION: 98 % | RESPIRATION RATE: 16 BRPM | SYSTOLIC BLOOD PRESSURE: 138 MMHG | DIASTOLIC BLOOD PRESSURE: 78 MMHG

## 2022-06-11 DIAGNOSIS — R05.9 COUGH: Primary | ICD-10-CM

## 2022-06-11 DIAGNOSIS — J34.89 SINUS PRESSURE: ICD-10-CM

## 2022-06-11 PROCEDURE — 99213 OFFICE O/P EST LOW 20 MIN: CPT | Performed by: FAMILY MEDICINE

## 2022-06-11 PROCEDURE — 3075F SYST BP GE 130 - 139MM HG: CPT | Performed by: FAMILY MEDICINE

## 2022-06-11 PROCEDURE — 87637 SARSCOV2&INF A&B&RSV AMP PRB: CPT | Performed by: FAMILY MEDICINE

## 2022-06-11 PROCEDURE — 3078F DIAST BP <80 MM HG: CPT | Performed by: FAMILY MEDICINE

## 2022-06-11 RX ORDER — AMOXICILLIN AND CLAVULANATE POTASSIUM 875; 125 MG/1; MG/1
1 TABLET, FILM COATED ORAL 2 TIMES DAILY
Qty: 20 TABLET | Refills: 0 | Status: SHIPPED | OUTPATIENT
Start: 2022-06-11 | End: 2022-06-21

## 2022-06-11 RX ORDER — BENZONATATE 200 MG/1
200 CAPSULE ORAL 3 TIMES DAILY PRN
Qty: 30 CAPSULE | Refills: 0 | Status: SHIPPED | OUTPATIENT
Start: 2022-06-11

## 2022-06-12 LAB
FLUAV + FLUBV RNA SPEC NAA+PROBE: NOT DETECTED
FLUAV + FLUBV RNA SPEC NAA+PROBE: NOT DETECTED
RSV RNA SPEC NAA+PROBE: NOT DETECTED
SARS-COV-2 RNA RESP QL NAA+PROBE: NOT DETECTED

## 2022-06-18 DIAGNOSIS — F51.05 INSOMNIA DUE TO OTHER MENTAL DISORDER: ICD-10-CM

## 2022-06-18 DIAGNOSIS — F99 INSOMNIA DUE TO OTHER MENTAL DISORDER: ICD-10-CM

## 2022-06-20 RX ORDER — ZOLPIDEM TARTRATE 5 MG/1
5 TABLET ORAL NIGHTLY PRN
Qty: 30 TABLET | Refills: 0 | Status: SHIPPED | OUTPATIENT
Start: 2022-06-20

## 2022-06-20 RX ORDER — ALPRAZOLAM 0.25 MG/1
0.25 TABLET ORAL 3 TIMES DAILY PRN
Qty: 20 TABLET | Refills: 0 | Status: SHIPPED | OUTPATIENT
Start: 2022-06-20

## 2022-07-27 DIAGNOSIS — F99 INSOMNIA DUE TO OTHER MENTAL DISORDER: ICD-10-CM

## 2022-07-27 DIAGNOSIS — F51.05 INSOMNIA DUE TO OTHER MENTAL DISORDER: ICD-10-CM

## 2022-07-28 RX ORDER — ZOLPIDEM TARTRATE 5 MG/1
5 TABLET ORAL NIGHTLY PRN
Qty: 30 TABLET | Refills: 0 | Status: SHIPPED | OUTPATIENT
Start: 2022-07-28

## 2022-08-12 ENCOUNTER — TELEPHONE (OUTPATIENT)
Dept: FAMILY MEDICINE CLINIC | Facility: CLINIC | Age: 47
End: 2022-08-12

## 2022-08-12 NOTE — TELEPHONE ENCOUNTER
Spoke with patient - in ER for stitches, unsure of last Tetanus shot - informed have no record of any tetanus immunizations - states will get in ER

## 2022-08-24 ENCOUNTER — OFFICE VISIT (OUTPATIENT)
Dept: FAMILY MEDICINE CLINIC | Facility: CLINIC | Age: 47
End: 2022-08-24
Payer: COMMERCIAL

## 2022-08-24 VITALS
RESPIRATION RATE: 12 BRPM | HEIGHT: 63 IN | TEMPERATURE: 98 F | OXYGEN SATURATION: 98 % | SYSTOLIC BLOOD PRESSURE: 128 MMHG | BODY MASS INDEX: 33.93 KG/M2 | HEART RATE: 73 BPM | DIASTOLIC BLOOD PRESSURE: 86 MMHG | WEIGHT: 191.5 LBS

## 2022-08-24 DIAGNOSIS — S51.812D LACERATION OF LEFT FOREARM, SUBSEQUENT ENCOUNTER: Primary | ICD-10-CM

## 2022-08-24 PROCEDURE — 3074F SYST BP LT 130 MM HG: CPT | Performed by: FAMILY MEDICINE

## 2022-08-24 PROCEDURE — 99213 OFFICE O/P EST LOW 20 MIN: CPT | Performed by: FAMILY MEDICINE

## 2022-08-24 PROCEDURE — 3008F BODY MASS INDEX DOCD: CPT | Performed by: FAMILY MEDICINE

## 2022-08-24 PROCEDURE — 3079F DIAST BP 80-89 MM HG: CPT | Performed by: FAMILY MEDICINE

## 2022-08-27 DIAGNOSIS — F99 INSOMNIA DUE TO OTHER MENTAL DISORDER: ICD-10-CM

## 2022-08-27 DIAGNOSIS — F51.05 INSOMNIA DUE TO OTHER MENTAL DISORDER: ICD-10-CM

## 2022-08-29 RX ORDER — ZOLPIDEM TARTRATE 5 MG/1
5 TABLET ORAL NIGHTLY PRN
Qty: 30 TABLET | Refills: 0 | Status: SHIPPED | OUTPATIENT
Start: 2022-08-29

## 2022-09-28 DIAGNOSIS — F99 INSOMNIA DUE TO OTHER MENTAL DISORDER: ICD-10-CM

## 2022-09-28 DIAGNOSIS — F51.05 INSOMNIA DUE TO OTHER MENTAL DISORDER: ICD-10-CM

## 2022-09-29 RX ORDER — ZOLPIDEM TARTRATE 5 MG/1
5 TABLET ORAL NIGHTLY PRN
Qty: 30 TABLET | Refills: 0 | Status: SHIPPED | OUTPATIENT
Start: 2022-09-29

## 2022-09-29 RX ORDER — ALPRAZOLAM 0.25 MG/1
0.25 TABLET ORAL 3 TIMES DAILY PRN
Qty: 20 TABLET | Refills: 0 | Status: SHIPPED | OUTPATIENT
Start: 2022-09-29

## 2022-10-24 ENCOUNTER — TELEPHONE (OUTPATIENT)
Dept: FAMILY MEDICINE CLINIC | Facility: CLINIC | Age: 47
End: 2022-10-24

## 2022-10-24 NOTE — TELEPHONE ENCOUNTER
DIANE has been creeping up over the past few days; this morning c/o headache, angina, and feeling short of breath. She is at work in [place], will call . She comments was on med for B/P about 2 to 3 years ago. Update to Dr Amanda Starks. Advised- needs to go to the ER. Expresses understanding.   ej/cj

## 2022-11-03 ENCOUNTER — OFFICE VISIT (OUTPATIENT)
Dept: FAMILY MEDICINE CLINIC | Facility: CLINIC | Age: 47
End: 2022-11-03
Payer: COMMERCIAL

## 2022-11-03 VITALS
RESPIRATION RATE: 16 BRPM | HEIGHT: 63 IN | SYSTOLIC BLOOD PRESSURE: 138 MMHG | TEMPERATURE: 98 F | HEART RATE: 60 BPM | DIASTOLIC BLOOD PRESSURE: 88 MMHG | WEIGHT: 190 LBS | BODY MASS INDEX: 33.66 KG/M2

## 2022-11-03 DIAGNOSIS — E11.69 DIABETES MELLITUS TYPE 2 IN OBESE (HCC): ICD-10-CM

## 2022-11-03 DIAGNOSIS — E66.9 DIABETES MELLITUS TYPE 2 IN OBESE (HCC): ICD-10-CM

## 2022-11-03 DIAGNOSIS — I10 BENIGN HYPERTENSION: ICD-10-CM

## 2022-11-03 DIAGNOSIS — R07.89 CHEST TIGHTNESS: Primary | ICD-10-CM

## 2022-11-03 DIAGNOSIS — F51.05 INSOMNIA DUE TO OTHER MENTAL DISORDER: ICD-10-CM

## 2022-11-03 DIAGNOSIS — M79.7 FIBROMYALGIA: ICD-10-CM

## 2022-11-03 DIAGNOSIS — F41.9 ANXIETY: ICD-10-CM

## 2022-11-03 DIAGNOSIS — F99 INSOMNIA DUE TO OTHER MENTAL DISORDER: ICD-10-CM

## 2022-11-03 PROCEDURE — 3008F BODY MASS INDEX DOCD: CPT | Performed by: FAMILY MEDICINE

## 2022-11-03 PROCEDURE — 3079F DIAST BP 80-89 MM HG: CPT | Performed by: FAMILY MEDICINE

## 2022-11-03 PROCEDURE — 99214 OFFICE O/P EST MOD 30 MIN: CPT | Performed by: FAMILY MEDICINE

## 2022-11-03 PROCEDURE — 3075F SYST BP GE 130 - 139MM HG: CPT | Performed by: FAMILY MEDICINE

## 2022-11-03 RX ORDER — LISINOPRIL AND HYDROCHLOROTHIAZIDE 12.5; 1 MG/1; MG/1
1 TABLET ORAL DAILY
Qty: 30 TABLET | Refills: 1 | Status: SHIPPED | OUTPATIENT
Start: 2022-11-03 | End: 2023-10-29

## 2022-11-03 RX ORDER — ZOLPIDEM TARTRATE 5 MG/1
5 TABLET ORAL NIGHTLY PRN
Qty: 30 TABLET | Refills: 0 | Status: SHIPPED | OUTPATIENT
Start: 2022-11-03

## 2022-11-03 NOTE — PATIENT INSTRUCTIONS
Meds as directed. Follow-up in 3 to 4 weeks. Monitor blood pressure. Recommend lab work. Call with questions or problems.   Stress test pending

## 2022-11-11 ENCOUNTER — HOSPITAL ENCOUNTER (OUTPATIENT)
Dept: CV DIAGNOSTICS | Facility: HOSPITAL | Age: 47
Discharge: HOME | End: 2022-11-11
Attending: FAMILY MEDICINE
Payer: COMMERCIAL

## 2022-11-11 DIAGNOSIS — I10 BENIGN HYPERTENSION: ICD-10-CM

## 2022-11-11 DIAGNOSIS — E11.69 DIABETES MELLITUS TYPE 2 IN OBESE (HCC): ICD-10-CM

## 2022-11-11 DIAGNOSIS — F41.9 ANXIETY: ICD-10-CM

## 2022-11-11 DIAGNOSIS — E66.9 DIABETES MELLITUS TYPE 2 IN OBESE (HCC): ICD-10-CM

## 2022-11-11 DIAGNOSIS — R07.89 CHEST TIGHTNESS: ICD-10-CM

## 2022-11-11 PROCEDURE — 93017 CV STRESS TEST TRACING ONLY: CPT | Performed by: FAMILY MEDICINE

## 2022-11-11 PROCEDURE — 93018 CV STRESS TEST I&R ONLY: CPT | Performed by: FAMILY MEDICINE

## 2022-11-15 ENCOUNTER — OFFICE VISIT (OUTPATIENT)
Dept: FAMILY MEDICINE CLINIC | Facility: CLINIC | Age: 47
End: 2022-11-15
Payer: COMMERCIAL

## 2022-11-15 VITALS
DIASTOLIC BLOOD PRESSURE: 64 MMHG | TEMPERATURE: 98 F | HEIGHT: 63 IN | WEIGHT: 190 LBS | BODY MASS INDEX: 33.66 KG/M2 | SYSTOLIC BLOOD PRESSURE: 102 MMHG | OXYGEN SATURATION: 100 % | RESPIRATION RATE: 18 BRPM | HEART RATE: 71 BPM

## 2022-11-15 DIAGNOSIS — Z20.828 EXPOSURE TO INFLUENZA: Primary | ICD-10-CM

## 2022-11-15 DIAGNOSIS — R68.89 FLU-LIKE SYMPTOMS: ICD-10-CM

## 2022-11-15 PROCEDURE — 99213 OFFICE O/P EST LOW 20 MIN: CPT | Performed by: NURSE PRACTITIONER

## 2022-11-15 PROCEDURE — 3078F DIAST BP <80 MM HG: CPT | Performed by: NURSE PRACTITIONER

## 2022-11-15 PROCEDURE — 3008F BODY MASS INDEX DOCD: CPT | Performed by: NURSE PRACTITIONER

## 2022-11-15 PROCEDURE — 3074F SYST BP LT 130 MM HG: CPT | Performed by: NURSE PRACTITIONER

## 2022-11-15 RX ORDER — OSELTAMIVIR PHOSPHATE 75 MG/1
75 CAPSULE ORAL 2 TIMES DAILY
Qty: 10 CAPSULE | Refills: 0 | Status: SHIPPED | OUTPATIENT
Start: 2022-11-15 | End: 2022-11-20

## 2022-11-29 DIAGNOSIS — F51.05 INSOMNIA DUE TO OTHER MENTAL DISORDER: ICD-10-CM

## 2022-11-29 DIAGNOSIS — F99 INSOMNIA DUE TO OTHER MENTAL DISORDER: ICD-10-CM

## 2022-11-30 RX ORDER — ZOLPIDEM TARTRATE 5 MG/1
5 TABLET ORAL NIGHTLY PRN
Qty: 30 TABLET | Refills: 0 | Status: SHIPPED | OUTPATIENT
Start: 2022-11-30

## 2022-12-05 ENCOUNTER — OFFICE VISIT (OUTPATIENT)
Dept: FAMILY MEDICINE CLINIC | Facility: CLINIC | Age: 47
End: 2022-12-05
Payer: COMMERCIAL

## 2022-12-05 VITALS
HEIGHT: 63 IN | HEART RATE: 62 BPM | DIASTOLIC BLOOD PRESSURE: 70 MMHG | RESPIRATION RATE: 16 BRPM | WEIGHT: 183 LBS | OXYGEN SATURATION: 98 % | TEMPERATURE: 99 F | SYSTOLIC BLOOD PRESSURE: 130 MMHG | BODY MASS INDEX: 32.43 KG/M2

## 2022-12-05 DIAGNOSIS — G56.03 CARPAL TUNNEL SYNDROME, BILATERAL: ICD-10-CM

## 2022-12-05 DIAGNOSIS — M77.8 THUMB TENDONITIS: Primary | ICD-10-CM

## 2022-12-05 DIAGNOSIS — M25.532 LEFT WRIST PAIN: ICD-10-CM

## 2022-12-05 PROCEDURE — 99214 OFFICE O/P EST MOD 30 MIN: CPT | Performed by: FAMILY MEDICINE

## 2022-12-05 PROCEDURE — 3075F SYST BP GE 130 - 139MM HG: CPT | Performed by: FAMILY MEDICINE

## 2022-12-05 PROCEDURE — 3078F DIAST BP <80 MM HG: CPT | Performed by: FAMILY MEDICINE

## 2022-12-05 PROCEDURE — 3008F BODY MASS INDEX DOCD: CPT | Performed by: FAMILY MEDICINE

## 2022-12-05 NOTE — PATIENT INSTRUCTIONS
Written excusing patient from working with her left hand until seen by Dr. Taqueria Grace. Patient placed in wrist immobilizer left hand. Alternate ice and heat. Massage. Call with questions or problems.

## 2022-12-27 RX ORDER — LISINOPRIL AND HYDROCHLOROTHIAZIDE 12.5; 1 MG/1; MG/1
1 TABLET ORAL DAILY
Qty: 30 TABLET | Refills: 1 | Status: SHIPPED | OUTPATIENT
Start: 2022-12-27 | End: 2023-12-22

## 2023-01-25 RX ORDER — LISINOPRIL AND HYDROCHLOROTHIAZIDE 12.5; 1 MG/1; MG/1
1 TABLET ORAL DAILY
Qty: 30 TABLET | Refills: 1 | OUTPATIENT
Start: 2023-01-25 | End: 2024-01-20

## 2023-01-25 RX ORDER — ALPRAZOLAM 0.25 MG/1
0.25 TABLET ORAL 3 TIMES DAILY PRN
Qty: 20 TABLET | Refills: 0 | Status: SHIPPED | OUTPATIENT
Start: 2023-01-25

## 2023-02-03 RX ORDER — LISINOPRIL AND HYDROCHLOROTHIAZIDE 12.5; 1 MG/1; MG/1
1 TABLET ORAL DAILY
Qty: 30 TABLET | Refills: 1 | Status: SHIPPED | OUTPATIENT
Start: 2023-02-03 | End: 2024-01-29

## 2023-02-03 NOTE — TELEPHONE ENCOUNTER
Hypertension Medications Protocol Passed 02/02/2023 06:10 PM   Protocol Details  CMP or BMP in past 12 months    Last serum creatinine< 2.0    Appointment in past 6 or next 3 months        Last office visit:  12/05/22  Last refill:  12/27/22  #30, 1 refill  Last cmp:  10/24/22  BP Readings from Last 3 Encounters:  12/05/22 : 130/70  11/15/22 : 102/64  11/03/22 : 138/88    No future appointments.

## 2023-03-13 RX ORDER — LISINOPRIL AND HYDROCHLOROTHIAZIDE 12.5; 1 MG/1; MG/1
1 TABLET ORAL DAILY
Qty: 90 TABLET | Refills: 0 | Status: SHIPPED | OUTPATIENT
Start: 2023-03-13 | End: 2024-03-07

## 2023-04-10 RX ORDER — LISINOPRIL AND HYDROCHLOROTHIAZIDE 12.5; 1 MG/1; MG/1
1 TABLET ORAL DAILY
Qty: 90 TABLET | Refills: 0 | Status: SHIPPED | OUTPATIENT
Start: 2023-04-10 | End: 2024-04-04

## 2023-04-10 NOTE — TELEPHONE ENCOUNTER
Lisinopril-hydrochlorothiazide      LOV  11-3-22    LAST LAB  10-24-22 Chem Profile                        Creatinine 0.73    LAST RX  3-12-23  #30    Next OV  No future appointments.       PROTOCOL  Hypertension Medications Protocol Passed 04/09/2023 03:32 AM   Protocol Details  CMP or BMP in past 12 months    Last serum creatinine< 2.0    Appointment in past 6 or next 3 months

## 2023-06-08 RX ORDER — ALPRAZOLAM 0.25 MG/1
0.25 TABLET ORAL 3 TIMES DAILY PRN
Qty: 20 TABLET | Refills: 0 | Status: SHIPPED | OUTPATIENT
Start: 2023-06-08

## 2023-06-08 NOTE — TELEPHONE ENCOUNTER
ALPRAZolam (XANAX) 0.25 MG Oral Tab     Last office visit:  12/5/22    No future appointments.   Last filled:  1/5/23   #20 with 0 refills   Last labs: 5/12/22

## 2023-07-10 RX ORDER — ALPRAZOLAM 0.25 MG/1
0.25 TABLET ORAL 3 TIMES DAILY PRN
Qty: 20 TABLET | Refills: 0 | Status: SHIPPED | OUTPATIENT
Start: 2023-07-10

## 2023-07-10 RX ORDER — LISINOPRIL AND HYDROCHLOROTHIAZIDE 12.5; 1 MG/1; MG/1
1 TABLET ORAL DAILY
Qty: 90 TABLET | Refills: 0 | Status: SHIPPED | OUTPATIENT
Start: 2023-07-10 | End: 2024-07-04

## 2023-07-10 NOTE — TELEPHONE ENCOUNTER
Requested Prescriptions     Pending Prescriptions Disp Refills    lisinopril-hydroCHLOROthiazide 10-12.5 MG Oral Tab 90 tablet 0     Sig: Take 1 tablet by mouth daily. ALPRAZolam (XANAX) 0.25 MG Oral Tab 20 tablet 0     Sig: Take 1 tablet (0.25 mg total) by mouth 3 (three) times daily as needed for Anxiety. Last refill alprazolam 6/8/23 #20  Last refill lisinopril/hydrochlorothiazide 4/10/23 #90  LOV 12/5/2022  No future appointments.   Last labs 5/9/2022  Reminder letter to patient -due for labs and office visit

## 2023-07-20 ENCOUNTER — OFFICE VISIT (OUTPATIENT)
Dept: FAMILY MEDICINE CLINIC | Facility: CLINIC | Age: 48
End: 2023-07-20

## 2023-07-20 VITALS
OXYGEN SATURATION: 97 % | RESPIRATION RATE: 12 BRPM | HEIGHT: 63 IN | WEIGHT: 192.25 LBS | DIASTOLIC BLOOD PRESSURE: 84 MMHG | HEART RATE: 68 BPM | SYSTOLIC BLOOD PRESSURE: 118 MMHG | BODY MASS INDEX: 34.06 KG/M2 | TEMPERATURE: 97 F

## 2023-07-20 DIAGNOSIS — J01.00 ACUTE NON-RECURRENT MAXILLARY SINUSITIS: Primary | ICD-10-CM

## 2023-07-20 DIAGNOSIS — R11.0 NAUSEA: ICD-10-CM

## 2023-07-20 PROCEDURE — 99213 OFFICE O/P EST LOW 20 MIN: CPT

## 2023-07-20 RX ORDER — AZITHROMYCIN 250 MG/1
TABLET, FILM COATED ORAL
Qty: 6 TABLET | Refills: 0 | Status: SHIPPED | OUTPATIENT
Start: 2023-07-20 | End: 2023-07-25

## 2023-09-24 ENCOUNTER — OFFICE VISIT (OUTPATIENT)
Dept: FAMILY MEDICINE CLINIC | Facility: CLINIC | Age: 48
End: 2023-09-24

## 2023-09-24 VITALS
RESPIRATION RATE: 18 BRPM | HEIGHT: 63 IN | TEMPERATURE: 98 F | WEIGHT: 188 LBS | OXYGEN SATURATION: 98 % | HEART RATE: 63 BPM | DIASTOLIC BLOOD PRESSURE: 86 MMHG | SYSTOLIC BLOOD PRESSURE: 128 MMHG | BODY MASS INDEX: 33.31 KG/M2

## 2023-09-24 DIAGNOSIS — B34.9 VIRAL SYNDROME: Primary | ICD-10-CM

## 2023-09-28 ENCOUNTER — OFFICE VISIT (OUTPATIENT)
Dept: FAMILY MEDICINE CLINIC | Facility: CLINIC | Age: 48
End: 2023-09-28

## 2023-09-28 ENCOUNTER — TELEPHONE (OUTPATIENT)
Dept: FAMILY MEDICINE CLINIC | Facility: CLINIC | Age: 48
End: 2023-09-28

## 2023-09-28 VITALS
DIASTOLIC BLOOD PRESSURE: 80 MMHG | SYSTOLIC BLOOD PRESSURE: 130 MMHG | OXYGEN SATURATION: 99 % | TEMPERATURE: 98 F | HEART RATE: 60 BPM

## 2023-09-28 DIAGNOSIS — J40 SINOBRONCHITIS: Primary | ICD-10-CM

## 2023-09-28 DIAGNOSIS — J45.21 MILD INTERMITTENT REACTIVE AIRWAY DISEASE WITH ACUTE EXACERBATION: ICD-10-CM

## 2023-09-28 DIAGNOSIS — J32.9 SINOBRONCHITIS: Primary | ICD-10-CM

## 2023-09-28 PROCEDURE — 99214 OFFICE O/P EST MOD 30 MIN: CPT | Performed by: FAMILY MEDICINE

## 2023-09-28 RX ORDER — PREDNISONE 20 MG/1
40 TABLET ORAL DAILY
Qty: 10 TABLET | Refills: 0 | Status: SHIPPED | OUTPATIENT
Start: 2023-09-28 | End: 2023-10-03

## 2023-09-28 RX ORDER — ALBUTEROL SULFATE 90 UG/1
2 AEROSOL, METERED RESPIRATORY (INHALATION) EVERY 4 HOURS PRN
Qty: 1 EACH | Refills: 1 | Status: SHIPPED | OUTPATIENT
Start: 2023-09-28

## 2023-09-28 RX ORDER — AZITHROMYCIN 250 MG/1
TABLET, FILM COATED ORAL
Qty: 6 TABLET | Refills: 0 | Status: SHIPPED | OUTPATIENT
Start: 2023-09-28 | End: 2023-10-02

## 2023-09-28 NOTE — TELEPHONE ENCOUNTER
Pt was seen at [facility] on Sunday for cough. She still has the cough & it is no better. She wanted to discuss.

## 2023-09-28 NOTE — TELEPHONE ENCOUNTER
Has been sick since 9/19/2023-started as virus- she went to [facility] on 9/24/2023- diagnosed viral syndrome. She wanted antibiotic but wasn't indicated. She continues to have sinus and chest symptoms- cough from PND- productive yellow phlegm; stuffy, fatigued. Has taken iván-seltzer cold, and mucinex. Update to Dr Tonya Chery.     ALLERGIES-  Omnicef [Cefdinir]      ANAPHYLAXIS  Gabapentin              OTHER (SEE COMMENTS)    Comment:Sores on lips

## 2023-10-06 NOTE — TELEPHONE ENCOUNTER
Last OV 09/28  Last refill 07/19 #20    Requested Prescriptions     Pending Prescriptions Disp Refills    ALPRAZolam (XANAX) 0.25 MG Oral Tab 20 tablet 0     Sig: Take 1 tablet (0.25 mg total) by mouth 3 (three) times daily as needed for Anxiety. No future appointments.

## 2023-10-09 RX ORDER — ALPRAZOLAM 0.25 MG/1
0.25 TABLET ORAL 3 TIMES DAILY PRN
Qty: 20 TABLET | Refills: 0 | Status: SHIPPED | OUTPATIENT
Start: 2023-10-09

## 2024-04-22 ENCOUNTER — OFFICE VISIT (OUTPATIENT)
Dept: FAMILY MEDICINE CLINIC | Facility: CLINIC | Age: 49
End: 2024-04-22

## 2024-04-22 VITALS
SYSTOLIC BLOOD PRESSURE: 132 MMHG | TEMPERATURE: 98 F | BODY MASS INDEX: 35 KG/M2 | DIASTOLIC BLOOD PRESSURE: 90 MMHG | WEIGHT: 195 LBS

## 2024-04-22 DIAGNOSIS — E11.9 NEW ONSET TYPE 2 DIABETES MELLITUS (HCC): ICD-10-CM

## 2024-04-22 DIAGNOSIS — R81 GLYCOSURIA: ICD-10-CM

## 2024-04-22 DIAGNOSIS — R30.0 DYSURIA: Primary | ICD-10-CM

## 2024-04-22 LAB
APPEARANCE: CLEAR
GLUCOSE (URINE DIPSTICK): >=1000 MG/DL
KETONES (URINE DIPSTICK): 15 MG/DL
LEUKOCYTES: NEGATIVE
MULTISTIX LOT#: ABNORMAL NUMERIC
NITRITE, URINE: NEGATIVE
OCCULT BLOOD: NEGATIVE
PH, URINE: 5.5 (ref 4.5–8)
PROTEIN (URINE DIPSTICK): NEGATIVE MG/DL
SPECIFIC GRAVITY: 1.02 (ref 1–1.03)
URINE-COLOR: YELLOW
UROBILINOGEN,SEMI-QN: 0.2 MG/DL (ref 0–1.9)

## 2024-04-22 PROCEDURE — 99213 OFFICE O/P EST LOW 20 MIN: CPT

## 2024-04-22 PROCEDURE — 81003 URINALYSIS AUTO W/O SCOPE: CPT

## 2024-04-22 PROCEDURE — 87086 URINE CULTURE/COLONY COUNT: CPT

## 2024-04-22 RX ORDER — FLUTICASONE PROPIONATE 50 MCG
SPRAY, SUSPENSION (ML) NASAL DAILY
COMMUNITY

## 2024-04-22 NOTE — PROGRESS NOTES
HPI:   Mel is a 48 yr. Old female here today for lower abdominal pain, urinary frequency, and burning on urination for the past week. Denies fever, chills, nausea, vomiting, diarrhea, constipation, hematuria, back pain, vaginal pain, discharge, odor. Her diet has been poor lately. Does not have insurance, hoping will have insurance next month.         Current Outpatient Medications   Medication Sig Dispense Refill    fluticasone propionate 50 MCG/ACT Nasal Suspension by Each Nare route daily.      ALPRAZolam (XANAX) 0.25 MG Oral Tab Take 1 tablet (0.25 mg total) by mouth 3 (three) times daily as needed for Anxiety. 20 tablet 0    albuterol (VENTOLIN HFA) 108 (90 Base) MCG/ACT Inhalation Aero Soln Inhale 2 puffs into the lungs every 4 (four) hours as needed for Wheezing. 1 each 1    lisinopril-hydroCHLOROthiazide 10-12.5 MG Oral Tab Take 1 tablet by mouth daily. (Patient not taking: Reported on 9/24/2023) 90 tablet 0    zolpidem 5 MG Oral Tab Take 1 tablet (5 mg total) by mouth nightly as needed for Sleep. (Patient not taking: Reported on 4/22/2024) 30 tablet 0      Past Medical History:    Anxiety    Fibromyalgia    Insomnia      Past Surgical History:   Procedure Laterality Date    Endometrial ablation        History reviewed. No pertinent family history.   Social History     Socioeconomic History    Marital status:    Tobacco Use    Smoking status: Never    Smokeless tobacco: Never   Vaping Use    Vaping status: Never Used   Substance and Sexual Activity    Alcohol use: Yes     Comment: occ    Drug use: Never     Social Determinants of Health      [facility]    Social Connections    [facility] from [facility], [facility]    Housing Stability         REVIEW OF SYSTEMS:   Review of Systems   Constitutional:  Negative for chills, fatigue and fever.   Respiratory: Negative.     Cardiovascular: Negative.     Gastrointestinal:  Positive for abdominal pain. Negative for constipation, diarrhea, nausea and vomiting.   Endocrine: Positive for polyphagia and polyuria. Negative for polydipsia.   Genitourinary:  Positive for dysuria and frequency. Negative for flank pain, hematuria, urgency and vaginal discharge.       EXAM:   /90 (BP Location: Left arm, Patient Position: Sitting, Cuff Size: adult)   Temp 97.9 °F (36.6 °C) (Temporal)   Wt 195 lb (88.5 kg)   BMI 34.54 kg/m²   Physical Exam  Vitals and nursing note reviewed.   Constitutional:       General: She is not in acute distress.     Appearance: Normal appearance.   HENT:      Head: Atraumatic.      Right Ear: Tympanic membrane, ear canal and external ear normal.      Left Ear: Tympanic membrane, ear canal and external ear normal.      Nose: Nose normal.      Mouth/Throat:      Mouth: Mucous membranes are moist.      Pharynx: Oropharynx is clear. No oropharyngeal exudate or posterior oropharyngeal erythema.   Eyes:      General:         Right eye: No discharge.         Left eye: No discharge.      Conjunctiva/sclera: Conjunctivae normal.   Cardiovascular:      Rate and Rhythm: Normal rate.   Pulmonary:      Effort: Pulmonary effort is normal.   Abdominal:      General: Abdomen is flat. Bowel sounds are normal.      Palpations: Abdomen is soft.      Tenderness: There is abdominal tenderness (mild epigastric). There is no right CVA tenderness, left CVA tenderness, guarding or rebound.   Skin:     General: Skin is warm and dry.   Neurological:      Mental Status: She is alert and oriented to person, place, and time.         ASSESSMENT AND PLAN:   Diagnoses and all orders for this visit:    Dysuria  -     Urine Dip, auto without Micro  -     Urine Culture, Routine [E]; Future    Glycosuria    New onset type 2 diabetes mellitus (HCC)    -Discussed in office urine with elevated glucose and ketones present.  Low suspicion for urinary tract infection, will send for  culture.  Recommend further laboratory workup to check for statust of DM type 2.  Patient defers at current due to no insurance, will callback to schedule office visit with fasting labs.  Patient reports her diet has been poor recently and there is much room for improvement.  Agrees to decrease intake of carbohydrates and sugars.  -Return for cpx, labs, symptom worsening, call with questions or problems.  -Patient verbalized understanding and in agreement with treatment plan.    Ingris Ge, APRN

## 2024-08-22 ENCOUNTER — OFFICE VISIT (OUTPATIENT)
Dept: FAMILY MEDICINE CLINIC | Facility: CLINIC | Age: 49
End: 2024-08-22
Payer: COMMERCIAL

## 2024-08-22 ENCOUNTER — TELEPHONE (OUTPATIENT)
Dept: FAMILY MEDICINE CLINIC | Facility: CLINIC | Age: 49
End: 2024-08-22

## 2024-08-22 VITALS
HEART RATE: 85 BPM | SYSTOLIC BLOOD PRESSURE: 118 MMHG | BODY MASS INDEX: 33.29 KG/M2 | RESPIRATION RATE: 20 BRPM | DIASTOLIC BLOOD PRESSURE: 80 MMHG | TEMPERATURE: 98 F | HEIGHT: 64 IN | WEIGHT: 195 LBS

## 2024-08-22 DIAGNOSIS — J01.10 ACUTE NON-RECURRENT FRONTAL SINUSITIS: Primary | ICD-10-CM

## 2024-08-22 RX ORDER — DOXYCYCLINE HYCLATE 100 MG
100 TABLET ORAL 2 TIMES DAILY
Qty: 20 TABLET | Refills: 0 | Status: SHIPPED | OUTPATIENT
Start: 2024-08-22 | End: 2024-09-01

## 2024-08-22 RX ORDER — BUTALBITAL, ACETAMINOPHEN AND CAFFEINE 50; 325; 40 MG/1; MG/1; MG/1
1 TABLET ORAL EVERY 6 HOURS PRN
COMMUNITY

## 2024-08-22 NOTE — TELEPHONE ENCOUNTER
She needs a note for work stating she was here today and that she will be off tomorrow as well.   She had an appointment with Ingris today.  Call her when it's ready and she will pick it up in the morning

## 2024-08-22 NOTE — PROGRESS NOTES
HPI:   Mel is a 48 yr. Old female here today for a headache, per patient she had emergency department visit for headache, prescribed butalbital-acetaminophen-caffeine on 8/19/24.  Per chart review CT imaging of head showed:    FINDINGS: The scalp and calvarium are unremarkable No evidence for acute hemorrhage or large vascular territory infarct.   The ventricles are in proportion to the sulci.  No evidence for mass or mass effect. Visualized portions of the orbits are normal.  Mild paranasal sinus disease.     IMPRESSION:   No acute intracranial abnormality.       Patient presents today reports the medication prescribed in emergency department is not helping, that her aqllergies flare this time of the year due to allergy to birch trees headache, fatigue that started on Sunday.  Light sensitive, dizziness, clammy.  Reports these are her typical sinus infection symptoms.         Current Outpatient Medications   Medication Sig Dispense Refill    butalbital-acetaminophen-caffeine -40 MG Oral Tab Take 1 tablet by mouth every 6 (six) hours as needed for Headaches.      Doxycycline Hyclate 100 MG Oral Tab Take 1 tablet (100 mg total) by mouth 2 (two) times daily for 10 days. 20 tablet 0    fluticasone propionate 50 MCG/ACT Nasal Suspension by Each Nare route daily.      albuterol (VENTOLIN HFA) 108 (90 Base) MCG/ACT Inhalation Aero Soln Inhale 2 puffs into the lungs every 4 (four) hours as needed for Wheezing. 1 each 1    ALPRAZolam (XANAX) 0.25 MG Oral Tab Take 1 tablet (0.25 mg total) by mouth 3 (three) times daily as needed for Anxiety. (Patient not taking: Reported on 8/22/2024) 20 tablet 0      Past Medical History:    Anxiety    Fibromyalgia    Insomnia      Past Surgical History:   Procedure Laterality Date    Endometrial ablation        History reviewed. No pertinent family history.   Social History     Socioeconomic History    Marital status:    Tobacco Use    Smoking status: Never    Smokeless  tobacco: Never   Vaping Use    Vaping status: Never Used   Substance and Sexual Activity    Alcohol use: Yes     Comment: occ    Drug use: Never     Social Determinants of Health      Received from [facility], [facility]    Social Connections    Received from [facility], [facility]    Housing Stability         REVIEW OF SYSTEMS:   Review of Systems   Constitutional:  Positive for chills and fatigue. Negative for fever.   HENT:  Positive for congestion, rhinorrhea and sinus pressure. Negative for ear discharge, ear pain, facial swelling, sore throat and trouble swallowing.    Eyes:  Positive for photophobia. Negative for pain, discharge, redness, itching and visual disturbance.   Respiratory:  Negative for cough, chest tightness and shortness of breath.    Cardiovascular: Negative.    Gastrointestinal: Negative.    Neurological:  Positive for dizziness and headaches. Negative for syncope, weakness, light-headedness and numbness.       EXAM:   /80 (BP Location: Left arm, Patient Position: Sitting, Cuff Size: large)   Pulse 85   Temp 97.8 °F (36.6 °C) (Temporal)   Resp 20   Ht 5' 4\" (1.626 m)   Wt 195 lb (88.5 kg)   BMI 33.47 kg/m²   Physical Exam  Vitals and nursing note reviewed.   Constitutional:       General: She is not in acute distress.  HENT:      Head: Atraumatic.      Right Ear: Tympanic membrane, ear canal and external ear normal.      Left Ear: Tympanic membrane, ear canal and external ear normal.      Nose: Rhinorrhea present.      Mouth/Throat:      Mouth: Mucous membranes are moist.      Pharynx: Oropharynx is clear. No oropharyngeal exudate or posterior oropharyngeal erythema.   Eyes:      General:         Right eye: No discharge.         Left eye: No discharge.      Extraocular Movements: Extraocular movements intact.      Conjunctiva/sclera: Conjunctivae normal.      Pupils: Pupils are equal, round, and  reactive to light.   Cardiovascular:      Rate and Rhythm: Normal rate and regular rhythm.      Pulses: Normal pulses.      Heart sounds: Normal heart sounds.   Pulmonary:      Effort: Pulmonary effort is normal.      Breath sounds: Normal breath sounds.   Musculoskeletal:      Cervical back: Neck supple.   Skin:     General: Skin is warm and dry.   Neurological:      General: No focal deficit present.      Mental Status: She is alert and oriented to person, place, and time.      Cranial Nerves: No cranial nerve deficit.         ASSESSMENT AND PLAN:   Diagnoses and all orders for this visit:    Acute non-recurrent frontal sinusitis  -     Doxycycline Hyclate 100 MG Oral Tab; Take 1 tablet (100 mg total) by mouth 2 (two) times daily for 10 days.  -     Hemoglobin A1C [E]; Future  -     Lipid Panel [E]; Future     -Discussed medication as prescribed.  Recommend over the counter decongestant as tolerated, cool mist humidifier, push fluids, can continue with migraine medication as prescribed by emergency department. Red flag symptoms of worsening headache discussed.  -Return for worsening, call with questions or problems.  -Patient verbalized understanding and in agreement with treatment plan.    REDD Sheldon

## 2024-08-26 ENCOUNTER — OFFICE VISIT (OUTPATIENT)
Dept: FAMILY MEDICINE CLINIC | Facility: CLINIC | Age: 49
End: 2024-08-26
Payer: COMMERCIAL

## 2024-08-26 ENCOUNTER — PATIENT MESSAGE (OUTPATIENT)
Dept: FAMILY MEDICINE CLINIC | Facility: CLINIC | Age: 49
End: 2024-08-26

## 2024-08-26 VITALS
DIASTOLIC BLOOD PRESSURE: 90 MMHG | TEMPERATURE: 98 F | RESPIRATION RATE: 18 BRPM | WEIGHT: 195 LBS | HEART RATE: 60 BPM | SYSTOLIC BLOOD PRESSURE: 136 MMHG | BODY MASS INDEX: 33 KG/M2

## 2024-08-26 DIAGNOSIS — R51.9 ACUTE INTRACTABLE HEADACHE, UNSPECIFIED HEADACHE TYPE: Primary | ICD-10-CM

## 2024-08-26 RX ORDER — KETOROLAC TROMETHAMINE 30 MG/ML
60 INJECTION, SOLUTION INTRAMUSCULAR; INTRAVENOUS ONCE
Status: COMPLETED | OUTPATIENT
Start: 2024-08-26 | End: 2024-08-26

## 2024-08-26 RX ADMIN — KETOROLAC TROMETHAMINE 60 MG: 30 INJECTION, SOLUTION INTRAMUSCULAR; INTRAVENOUS at 09:45:00

## 2024-08-26 NOTE — TELEPHONE ENCOUNTER
From: Mel Byers  To: Ingris Ge  Sent: 8/26/2024 11:38 AM CDT  Subject: Headache     The headache and pain in my neck is gone. Yay! Thank you !  Mel Byers

## 2024-08-26 NOTE — PROGRESS NOTES
HPI:   Mel is a 48 yr. Old female here today for a headache. Patient was seen in emergency department on 8/19/24 for headache.  Patient was given prescription for butalbital-acetaminophen-caffeine, seen in office on 8/22/24 and prescribed doxycycline for sinusitis.  Patient presents today reports the headache has not left her.  Reports light sensitivity and fatigue are her most predominant symptoms.  Denies fever, chills, n/v/d.  Reports he head congestion is clearing on antibiotic.     ED Course as of 08/19/24 1340   Mon Aug 19, 2024   1210 CT Brain without IV Contrast  FINDINGS: The scalp and calvarium are unremarkable No evidence for acute hemorrhage or large vascular territory infarct. The ventricles are in proportion to the sulci. No evidence for mass or mass effect. Visualized portions of the orbits are normal. Mild paranasal sinus disease.   Impression  No acute intracranial abnormality.              Current Outpatient Medications   Medication Sig Dispense Refill    Doxycycline Hyclate 100 MG Oral Tab Take 1 tablet (100 mg total) by mouth 2 (two) times daily for 10 days. 20 tablet 0    fluticasone propionate 50 MCG/ACT Nasal Suspension by Each Nare route daily.      butalbital-acetaminophen-caffeine -40 MG Oral Tab Take 1 tablet by mouth every 6 (six) hours as needed for Headaches.      ALPRAZolam (XANAX) 0.25 MG Oral Tab Take 1 tablet (0.25 mg total) by mouth 3 (three) times daily as needed for Anxiety. (Patient not taking: Reported on 8/22/2024) 20 tablet 0    albuterol (VENTOLIN HFA) 108 (90 Base) MCG/ACT Inhalation Aero Soln Inhale 2 puffs into the lungs every 4 (four) hours as needed for Wheezing. 1 each 1      Past Medical History:    Anxiety    Fibromyalgia    Insomnia      Past Surgical History:   Procedure Laterality Date    Endometrial ablation        History reviewed. No pertinent family history.   Social History     Socioeconomic History    Marital status:    Tobacco Use    Smoking  status: Never    Smokeless tobacco: Never   Vaping Use    Vaping status: Never Used   Substance and Sexual Activity    Alcohol use: Yes     Comment: occ    Drug use: Never     Social Determinants of Health      [facility]    Social Connections    [facility]    Housing Stability         REVIEW OF SYSTEMS:   Review of Systems   Constitutional:  Positive for fatigue. Negative for diaphoresis and fever.   HENT:  Positive for congestion. Negative for ear discharge, ear pain, facial swelling and rhinorrhea.    Eyes: Negative.    Respiratory:  Negative for cough, chest tightness and shortness of breath.    Cardiovascular:  Negative for chest pain and palpitations.   Gastrointestinal:  Negative for nausea and vomiting.   Musculoskeletal:  Negative for neck pain and neck stiffness.   Neurological:  Positive for headaches. Negative for dizziness, syncope, weakness, light-headedness and numbness.       EXAM:   /90 (BP Location: Left arm, Patient Position: Sitting, Cuff Size: adult)   Pulse 60   Temp 97.9 °F (36.6 °C) (Temporal)   Resp 18   Wt 195 lb (88.5 kg)   BMI 33.47 kg/m²   Physical Exam  Vitals and nursing note reviewed.   Constitutional:       General: She is not in acute distress.  HENT:      Head: Atraumatic.      Right Ear: Tympanic membrane, ear canal and external ear normal.      Left Ear: Tympanic membrane, ear canal and external ear normal.      Nose: Nose normal.      Mouth/Throat:      Mouth: Mucous membranes are moist.   Eyes:      Conjunctiva/sclera: Conjunctivae normal.      Pupils: Pupils are equal, round, and reactive to light.   Cardiovascular:      Rate and Rhythm: Normal rate.   Pulmonary:      Effort: Pulmonary effort is normal.   Musculoskeletal:      Cervical back: Neck supple.   Skin:     General: Skin is warm and dry.   Neurological:      General: No focal  deficit present.      Mental Status: She is alert and oriented to person, place, and time.         ASSESSMENT AND PLAN:   Diagnoses and all orders for this visit:    Acute intractable headache, unspecified headache type  -     ketorolac (Toradol) 60 MG/2ML IM injection 60 mg     -Discussed will order ketorolac injection today.  Recommend patient continue medications as prescribed.  Discussed neurology referral. Encouraged patient to return to emergency department for worsening of symptoms.  -Return for worsening or incomplete resolution, call with questions or problems.  -Patient verbalized understanding and in agreement with treatment plan.    30 minutes were spent on assessment, education, and discussion of treatment plan.    Ingris Ge, APRN

## 2025-03-17 ENCOUNTER — TELEPHONE (OUTPATIENT)
Dept: FAMILY MEDICINE CLINIC | Facility: CLINIC | Age: 50
End: 2025-03-17

## 2025-03-17 DIAGNOSIS — J40 SINOBRONCHITIS: ICD-10-CM

## 2025-03-17 DIAGNOSIS — J32.9 SINOBRONCHITIS: ICD-10-CM

## 2025-03-17 RX ORDER — ALBUTEROL SULFATE 90 UG/1
2 INHALANT RESPIRATORY (INHALATION) EVERY 4 HOURS PRN
Qty: 1 EACH | Refills: 0 | Status: SHIPPED | OUTPATIENT
Start: 2025-03-17

## 2025-03-17 NOTE — TELEPHONE ENCOUNTER
albuterol (VENTOLIN HFA) 108 - accidentally sent to [facility] in [place], needs to go to  [id number], [place] [address]patient states pollen level is very high and is very wheezy- asking if any way this can be expedited today

## 2025-03-17 NOTE — TELEPHONE ENCOUNTER
Spoke with Mel to let her know that she is able to call the pharmacy to have them pul it to the pharmacy she is at in [place]. She understands and will call them to do.

## 2025-03-17 NOTE — TELEPHONE ENCOUNTER
Patient responded to message, stating she is in [place] and needs a refill on her inhaler.  Routing to Dr. Noel for approval

## 2025-03-17 NOTE — TELEPHONE ENCOUNTER
Refill request -  albuterol (VENTOLIN HFA) 108 (90 Base) MCG/ACT Inhalation Aero Soln - dispense 1 and 1 refill    Last 09/28/23  Last office visit 09/28/23

## 2025-04-07 ENCOUNTER — OFFICE VISIT (OUTPATIENT)
Dept: FAMILY MEDICINE CLINIC | Facility: CLINIC | Age: 50
End: 2025-04-07

## 2025-04-07 VITALS
SYSTOLIC BLOOD PRESSURE: 110 MMHG | BODY MASS INDEX: 32.43 KG/M2 | RESPIRATION RATE: 12 BRPM | HEART RATE: 83 BPM | WEIGHT: 183 LBS | TEMPERATURE: 97 F | HEIGHT: 63 IN | DIASTOLIC BLOOD PRESSURE: 84 MMHG | OXYGEN SATURATION: 98 %

## 2025-04-07 DIAGNOSIS — L30.4 INTERTRIGO: Primary | ICD-10-CM

## 2025-04-07 PROCEDURE — 99213 OFFICE O/P EST LOW 20 MIN: CPT

## 2025-04-07 RX ORDER — FLUCONAZOLE 150 MG/1
150 TABLET ORAL ONCE
Qty: 1 TABLET | Refills: 0 | Status: SHIPPED | OUTPATIENT
Start: 2025-04-07 | End: 2025-04-07

## 2025-04-07 RX ORDER — PRENATAL VIT 91/IRON/FOLIC/DHA 28-975-200
1 COMBINATION PACKAGE (EA) ORAL 3 TIMES DAILY
Qty: 12 G | Refills: 0 | Status: SHIPPED | OUTPATIENT
Start: 2025-04-07 | End: 2025-04-21

## 2025-04-07 NOTE — PROGRESS NOTES
HPI:   Mel is a 49 yr. Old female here today for a a rash.  Patient reports a rash under right breast.  First had some discomfort to the area around 2-3 weeks ago after returned home from [place].  Noticed red bumps that looked like bug bites around 1 week ago, the rash was very itchy. Now a big red spot.  Patient has tried otc antifungal cream for the last 3-4 days which has helped to calm the itching some.  Denies fever, chills, n/v/d, unexpected weight loss, nipple discharge.     No mammogram of file, per patient she is \"funny\" about them.         Current Outpatient Medications   Medication Sig Dispense Refill    terbinafine 1 % External Cream Apply 1 Application topically in the morning, at noon, and at bedtime for 14 days. 12 g 0    fluconazole 150 MG Oral Tab Take 1 tablet (150 mg total) by mouth once for 1 dose. 1 tablet 0    albuterol (VENTOLIN HFA) 108 (90 Base) MCG/ACT Inhalation Aero Soln Inhale 2 puffs into the lungs every 4 (four) hours as needed for Wheezing. 1 each 0    ALPRAZolam (XANAX) 0.25 MG Oral Tab Take 1 tablet (0.25 mg total) by mouth nightly as needed for Anxiety or Sleep. 20 tablet 0    butalbital-acetaminophen-caffeine -40 MG Oral Tab Take 1 tablet by mouth every 6 (six) hours as needed for Headaches.      fluticasone propionate 50 MCG/ACT Nasal Suspension by Each Nare route daily.        Past Medical History:    Anxiety    Fibromyalgia    Insomnia      Past Surgical History:   Procedure Laterality Date    Endometrial ablation        History reviewed. No pertinent family history.   Social History     Socioeconomic History    Marital status:    Tobacco Use    Smoking status: Never    Smokeless tobacco: Never   Vaping Use    Vaping status: Never Used   Substance and Sexual Activity    Alcohol use: Yes     Comment: occ    Drug use: Never     Social Drivers of Health      Received [facility], [facility]    Housing Stability          REVIEW OF SYSTEMS:   Review of Systems   Constitutional:  Positive for fatigue (chronic past 10 years per patient). Negative for chills and fever.   Respiratory:  Negative for chest tightness and shortness of breath.    Cardiovascular:  Negative for chest pain and palpitations.   Gastrointestinal:  Negative for abdominal pain, diarrhea, nausea and vomiting.   Musculoskeletal:  Positive for arthralgias (hips).   Skin:  Positive for rash (see hpi).       EXAM:   /84 (BP Location: Right arm, Patient Position: Sitting, Cuff Size: large)   Pulse 83   Temp 97 °F (36.1 °C) (Temporal)   Resp 12   Ht 5' 3\" (1.6 m)   Wt 183 lb (83 kg)   SpO2 98%   BMI 32.42 kg/m²   Physical Exam  Vitals reviewed.   Constitutional:       General: She is not in acute distress.     Appearance: Normal appearance. She is not ill-appearing.   HENT:      Head: Atraumatic.      Mouth/Throat:      Mouth: Mucous membranes are moist.   Cardiovascular:      Rate and Rhythm: Normal rate.   Pulmonary:      Effort: Pulmonary effort is normal.   Skin:     General: Skin is warm and dry.      Findings: Rash (erythematous plaque with satellite lesions under right breast) present.   Neurological:      Mental Status: She is alert and oriented to person, place, and time.         ASSESSMENT AND PLAN:   Diagnoses and all orders for this visit:    Intertrigo  -     terbinafine 1 % External Cream; Apply 1 Application topically in the morning, at noon, and at bedtime for 14 days.  -     fluconazole 150 MG Oral Tab; Take 1 tablet (150 mg total) by mouth once for 1 dose.     -Medication sent to pharmacy.  Recommended prevention and at home treatment discussed.  See patient instructions.  Recommend monitor for resolution.  Discussed importance of breast cancer screening via mammogram.  Patient reports will follow up for persistent or worsening symptoms.  Discussed importance of establishing with new primary care provider.    -Patient verbalized  understanding and in agreement with plan.    Ingris Ge, APRN

## 2025-04-14 ENCOUNTER — PATIENT MESSAGE (OUTPATIENT)
Dept: FAMILY MEDICINE CLINIC | Facility: CLINIC | Age: 50
End: 2025-04-14

## 2025-04-14 DIAGNOSIS — L30.4 INTERTRIGO: ICD-10-CM

## 2025-04-14 RX ORDER — PRENATAL VIT 91/IRON/FOLIC/DHA 28-975-200
1 COMBINATION PACKAGE (EA) ORAL 3 TIMES DAILY
Qty: 12 G | Refills: 0 | Status: SHIPPED | OUTPATIENT
Start: 2025-04-14 | End: 2025-04-28

## (undated) DIAGNOSIS — E66.01 CLASS 2 SEVERE OBESITY WITH SERIOUS COMORBIDITY IN ADULT, UNSPECIFIED BMI, UNSPECIFIED OBESITY TYPE (HCC): ICD-10-CM

## (undated) DIAGNOSIS — F51.05 INSOMNIA DUE TO OTHER MENTAL DISORDER: ICD-10-CM

## (undated) DIAGNOSIS — F99 INSOMNIA DUE TO OTHER MENTAL DISORDER: ICD-10-CM

## (undated) DIAGNOSIS — E11.69 DIABETES MELLITUS TYPE 2 IN OBESE (HCC): Primary | ICD-10-CM

## (undated) DIAGNOSIS — E66.9 DIABETES MELLITUS TYPE 2 IN OBESE (HCC): Primary | ICD-10-CM

## (undated) DIAGNOSIS — I10 BENIGN HYPERTENSION: ICD-10-CM

## (undated) NOTE — LETTER
Date: 1/5/2022    Patient Name: Dallas Mckeon          To Whom it may concern: The above patient was seen at the [facility] for treatment of a medical condition.     This patient should be excused from attending work 1/5-1/7 tested and t

## (undated) NOTE — LETTER
Date: 8/22/2024    Patient Name: Mel Byers          To Whom it may concern:    This letter has been written at the patient's request. The above patient was seen at [facility] for treatment of a medical condition.    This patient should be excused from attending work from 8/22/2024 through 8/23/2024.    The patient may return to work/school on 8/26/2024 with no limitations.        Sincerely,    REDD Sheldon

## (undated) NOTE — LETTER
Date: 11/15/2022    Patient Name: Tracey Judge          To Whom it may concern: This letter has been written at the patient's request. The above patient was seen at the [facility] for treatment of a medical condition. This patient should be excused from attending work until fever free for 24 hours and feeling better. She is expected to return to work on or around 11/20/22.         Sincerely,    REDD Mejía

## (undated) NOTE — LETTER
Date: 2022    Patient Name: Alysa reveles- 1975        To Whom it may concern: This letter has been written at the patient's request. The above patient was seen at the [facility] for treatment of a medical condition. The patient may return to work on 2022 with the following limitations -no work with left hand until seen by orthopedic.           Sincerely,    Sandra Lyon DO

## (undated) NOTE — LETTER
Date: 7/20/2023    Patient Name: Mame Hernandez          To Whom it may concern: This letter has been written at the patient's request. The above patient was seen at the [facility] for treatment of a medical condition. This patient should be excused from attending work from 7/19/23 through 7/21/23.           Sincerely,    REDD Bermudez